# Patient Record
Sex: MALE | Race: WHITE | Employment: FULL TIME | URBAN - METROPOLITAN AREA
[De-identification: names, ages, dates, MRNs, and addresses within clinical notes are randomized per-mention and may not be internally consistent; named-entity substitution may affect disease eponyms.]

---

## 2018-05-01 ENCOUNTER — OFFICE VISIT (OUTPATIENT)
Dept: ENDOCRINOLOGY | Age: 43
End: 2018-05-01
Payer: MEDICAID

## 2018-05-01 VITALS
HEART RATE: 78 BPM | DIASTOLIC BLOOD PRESSURE: 89 MMHG | BODY MASS INDEX: 31.01 KG/M2 | SYSTOLIC BLOOD PRESSURE: 133 MMHG | HEIGHT: 73 IN | WEIGHT: 234 LBS

## 2018-05-01 DIAGNOSIS — E55.9 VITAMIN D DEFICIENCY: ICD-10-CM

## 2018-05-01 DIAGNOSIS — E29.1 HYPOGONADISM MALE: Primary | ICD-10-CM

## 2018-05-01 PROBLEM — G43.909 MIGRAINES: Status: ACTIVE | Noted: 2018-05-01

## 2018-05-01 PROCEDURE — G8427 DOCREV CUR MEDS BY ELIG CLIN: HCPCS | Performed by: INTERNAL MEDICINE

## 2018-05-01 PROCEDURE — 99213 OFFICE O/P EST LOW 20 MIN: CPT | Performed by: INTERNAL MEDICINE

## 2018-05-01 PROCEDURE — G8417 CALC BMI ABV UP PARAM F/U: HCPCS | Performed by: INTERNAL MEDICINE

## 2018-05-01 PROCEDURE — 1036F TOBACCO NON-USER: CPT | Performed by: INTERNAL MEDICINE

## 2018-05-01 RX ORDER — TESTOSTERONE CYPIONATE 200 MG/ML
INJECTION INTRAMUSCULAR
Qty: 10 ML | Refills: 0 | Status: SHIPPED | OUTPATIENT
Start: 2018-05-01 | End: 2018-06-14 | Stop reason: SDUPTHER

## 2018-06-14 DIAGNOSIS — E29.1 HYPOGONADISM MALE: Primary | ICD-10-CM

## 2018-06-14 RX ORDER — TESTOSTERONE CYPIONATE 200 MG/ML
INJECTION INTRAMUSCULAR
Qty: 10 ML | Refills: 0 | Status: SHIPPED | OUTPATIENT
Start: 2018-06-14 | End: 2018-09-18 | Stop reason: SDUPTHER

## 2018-07-03 ENCOUNTER — TELEPHONE (OUTPATIENT)
Dept: FAMILY MEDICINE CLINIC | Age: 43
End: 2018-07-03

## 2018-07-03 NOTE — TELEPHONE ENCOUNTER
Patient wife called stated she wanted to make sure the patients testosterone was covered and if he needed to continue with his follow up appt.  Please call patient and advise

## 2018-09-10 LAB
ANION GAP SERPL CALCULATED.3IONS-SCNC: 12 MMOL/L (ref 10–20)
BICARBONATE: 25 MMOL/L (ref 21–32)
BUN / CREAT RATIO: 14 (ref 5–25)
CALCIUM SERPL-MCNC: 9.1 MG/DL (ref 8.6–10.3)
CHLORIDE BLD-SCNC: 107 MMOL/L (ref 98–107)
CREAT SERPL-MCNC: 0.96 MG/DL (ref 0.5–1.3)
ERYTHROCYTE [DISTWIDTH] IN BLOOD BY AUTOMATED COUNT: 13.2 % (ref 12–15.4)
ERYTHROCYTE [DISTWIDTH] IN BLOOD BY AUTOMATED COUNT: 47.5 FL (ref 39.3–48.6)
GFR CALCULATED: >60
GLUCOSE: 95 MG/DL (ref 70–100)
HCT VFR BLD CALC: 47.4 % (ref 38.4–54.9)
HEMOGLOBIN: 15.5 G/DL (ref 12.8–17.7)
MCH RBC QN AUTO: 31.8 PG (ref 27.5–32.9)
MCHC RBC AUTO-ENTMCNC: 32.7 G/DL (ref 30.5–35.4)
MCV RBC AUTO: 97.3 FL (ref 83.3–98.2)
NUCLEATED RBCS: 0 /100{WBCS}
PLATELET # BLD: 223 10*3/UL (ref 155–404)
PMV BLD AUTO: 11.1 FL (ref 9.9–12.1)
POTASSIUM SERPL-SCNC: 4.6 MMOL/L (ref 3.5–5.1)
RBC: 4.87 10*6/UL (ref 4.08–6.37)
RBCS COUNTED: 0 10*3/UL
SODIUM BLD-SCNC: 139 MMOL/L (ref 136–145)
UREA NITROGEN: 13 MG/DL (ref 6–23)
VITAMIN D 25-HYDROXY: 22 NG/ML
WBC: 7 10*3/UL (ref 4.2–11)

## 2018-09-11 LAB
SEX HORMONE BINDING GLOBULIN: 10 NMOL/L (ref 11–80)
TESTOSTERONE FREE PERCENT: 3 % (ref 1.6–2.9)
TESTOSTERONE FREE-MALE: 263 PG/ML (ref 47–244)
TESTOSTERONE TOTAL-MALE: 872 NG/DL (ref 300–890)

## 2018-09-18 ENCOUNTER — OFFICE VISIT (OUTPATIENT)
Dept: ENDOCRINOLOGY | Age: 43
End: 2018-09-18
Payer: MEDICAID

## 2018-09-18 VITALS
HEART RATE: 95 BPM | SYSTOLIC BLOOD PRESSURE: 143 MMHG | HEIGHT: 73 IN | DIASTOLIC BLOOD PRESSURE: 103 MMHG | WEIGHT: 234 LBS | BODY MASS INDEX: 31.01 KG/M2

## 2018-09-18 DIAGNOSIS — E29.1 HYPOGONADISM MALE: Primary | ICD-10-CM

## 2018-09-18 DIAGNOSIS — G44.59 OTHER COMPLICATED HEADACHE SYNDROME: ICD-10-CM

## 2018-09-18 PROCEDURE — 99213 OFFICE O/P EST LOW 20 MIN: CPT | Performed by: INTERNAL MEDICINE

## 2018-09-18 PROCEDURE — G8417 CALC BMI ABV UP PARAM F/U: HCPCS | Performed by: INTERNAL MEDICINE

## 2018-09-18 PROCEDURE — G8427 DOCREV CUR MEDS BY ELIG CLIN: HCPCS | Performed by: INTERNAL MEDICINE

## 2018-09-18 PROCEDURE — 1036F TOBACCO NON-USER: CPT | Performed by: INTERNAL MEDICINE

## 2018-09-18 RX ORDER — TESTOSTERONE CYPIONATE 200 MG/ML
INJECTION INTRAMUSCULAR
Qty: 10 ML | Refills: 0 | Status: SHIPPED | OUTPATIENT
Start: 2018-09-18 | End: 2019-04-02 | Stop reason: SDUPTHER

## 2018-09-24 NOTE — PROGRESS NOTES
syndrome  Referral To Neurology - (CHEPE) MD Yevgeniy Reaves           Plan:      Orders Placed This Encounter   Procedures    Testosterone Free and Total Male     Standing Status:   Future     Standing Expiration Date:   9/18/2019    CBC     Standing Status:   Future     Standing Expiration Date:   9/18/2019    Referral To Neurology - (CHEPE) MD Yevgeniy Reaves     Referral Priority:   Routine     Referral Type:   Eval and Treat     Referral Reason:   Specialty Services Required     Referred to Provider:   Candice Abdi MD     Requested Specialty:   Neurology     Number of Visits Requested:   1     Orders Placed This Encounter   Medications    testosterone cypionate (DEPOTESTOTERONE CYPIONATE) 200 MG/ML injection     Sig: inject 1 ml INTRAMUSCULARLY once every 10 days. .     Dispense:  10 mL     Refill:  0

## 2019-04-02 ENCOUNTER — OFFICE VISIT (OUTPATIENT)
Dept: ENDOCRINOLOGY | Age: 44
End: 2019-04-02
Payer: MEDICAID

## 2019-04-02 VITALS
WEIGHT: 236 LBS | SYSTOLIC BLOOD PRESSURE: 145 MMHG | BODY MASS INDEX: 31.28 KG/M2 | HEART RATE: 80 BPM | HEIGHT: 73 IN | DIASTOLIC BLOOD PRESSURE: 101 MMHG

## 2019-04-02 DIAGNOSIS — E29.1 HYPOGONADISM MALE: ICD-10-CM

## 2019-04-02 DIAGNOSIS — D35.01 ADRENAL ADENOMA, RIGHT: Primary | ICD-10-CM

## 2019-04-02 PROCEDURE — 1036F TOBACCO NON-USER: CPT | Performed by: INTERNAL MEDICINE

## 2019-04-02 PROCEDURE — 99213 OFFICE O/P EST LOW 20 MIN: CPT | Performed by: INTERNAL MEDICINE

## 2019-04-02 PROCEDURE — G8417 CALC BMI ABV UP PARAM F/U: HCPCS | Performed by: INTERNAL MEDICINE

## 2019-04-02 PROCEDURE — G8427 DOCREV CUR MEDS BY ELIG CLIN: HCPCS | Performed by: INTERNAL MEDICINE

## 2019-04-02 RX ORDER — TESTOSTERONE CYPIONATE 200 MG/ML
INJECTION INTRAMUSCULAR
Qty: 10 ML | Refills: 0 | Status: SHIPPED | OUTPATIENT
Start: 2019-04-02 | End: 2019-08-12 | Stop reason: SDUPTHER

## 2019-04-02 NOTE — PROGRESS NOTES
Subjective:      Patient ID: Demetra Johnson is a 41 y.o. male  6  month f/u  hypogonadism   Other   This is a chronic (hypogonadism) problem. The current episode started more than 1 year ago. The problem has been unchanged. Treatments tried: Testosterone injections. The treatment provided moderate relief. No labs to review    Blood pressures have been high patient has been started on hydrochlorothiazide    pt had MRI SHOWING POSSIBLE ADRENAL ADENOMA at [facility] [place] according to him he had anxiety or panic attack    Denies any active or chest pain    According to patient he has had some anxiety off and on for many years      HAD MRI of abdomen done 4/1/19     Patient Active Problem List   Diagnosis    Hypogonadism male    Migraines           Allergies   Allergen Reactions    Nsaids Nausea Only       Current Outpatient Medications:     HYDROCHLOROTHIAZIDE PO, Take by mouth, Disp: , Rfl:     SYRINGE-NEEDLE, DISP, 3 ML (B-D INTEGRA SYRINGE) 22G X 1-1/2\" 3 ML MISC, Use as needed, Disp: 20 each, Rfl: 6    Pantoprazole Sodium (PROTONIX) 40 MG PACK packet, Take 40 mg by mouth daily. , Disp: , Rfl:     testosterone cypionate (DEPOTESTOTERONE CYPIONATE) 200 MG/ML injection, inject 1 ml INTRAMUSCULARLY once every 10 days. ., Disp: 10 mL, Rfl: 0      Review of Systems   Psychiatric/Behavioral: The patient is nervous/anxious. All other systems reviewed and are negative. Vitals:    04/02/19 1747   BP: (!) 145/101   Site: Right Upper Arm   Position: Sitting   Cuff Size: Large Adult   Pulse: 80   Weight: 236 lb (107 kg)   Height: 6' 1\" (1.854 m)       Objective:   Physical Exam   Constitutional: He appears well-developed and well-nourished. HENT:   Head: Normocephalic and atraumatic. Eyes: Conjunctivae are normal.   Neck: Neck supple. Cardiovascular: Normal rate. Abdominal:   Obese    Musculoskeletal: Normal range of motion. Neurological: He is alert. Psychiatric: He has a normal mood and affect. Assessment:       Diagnosis Orders   1. Adrenal adenoma, right     2. Hypogonadism male             Plan:      Orders Placed This Encounter   Procedures    Metanephrines Plasma Free     Standing Status:   Future     Standing Expiration Date:   4/2/2020    Cortisol Total     Standing Status:   Future     Standing Expiration Date:   4/2/2020     Order Specific Question:   8AM or 4PM?     Answer:   acth    Aldosterone & Renin, Direct With Ratio     Standing Status:   Future     Standing Expiration Date:   4/2/2020     Orders Placed This Encounter   Medications    testosterone cypionate (DEPOTESTOTERONE CYPIONATE) 200 MG/ML injection     Sig: inject 1 ml INTRAMUSCULARLY once every 10 days.      Dispense:  10 mL     Refill:  0    SYRINGE-NEEDLE, DISP, 3 ML (B-D INTEGRA SYRINGE) 22G X 1-1/2\" 3 ML MISC     Sig: Use as needed     Dispense:  20 each     Refill:  6     Patient to bring the copies of his MRI of the abdomen we will do serum free metanephrine to screen for pheochromocytoma follow-up in 4 weeks time

## 2019-04-04 LAB — CORTISOL TOTAL, AM: 7.5 UG/DL (ref 6.7–22.6)

## 2019-04-07 LAB
ALDOSTERONE/RENIN ACTIVITY CALCULATION: 6.8 RATIO
ALDOSTERONE: 6.2 NG/DL
RENIN ACTIVITY: 0.9 NG/ML/HR

## 2019-04-08 LAB
INTERPRETATION: NORMAL
METANEPHRINE: 0.22 NMOL/L (ref 0–0.49)
NORMETANEPHRINE: 0.21 NMOL/L (ref 0–0.89)

## 2019-04-30 ENCOUNTER — OFFICE VISIT (OUTPATIENT)
Dept: ENDOCRINOLOGY | Age: 44
End: 2019-04-30
Payer: MEDICAID

## 2019-04-30 VITALS
DIASTOLIC BLOOD PRESSURE: 95 MMHG | HEIGHT: 73 IN | SYSTOLIC BLOOD PRESSURE: 163 MMHG | WEIGHT: 237 LBS | HEART RATE: 99 BPM | BODY MASS INDEX: 31.41 KG/M2

## 2019-04-30 DIAGNOSIS — E29.1 HYPOGONADISM MALE: ICD-10-CM

## 2019-04-30 DIAGNOSIS — I10 ESSENTIAL HYPERTENSION: Primary | ICD-10-CM

## 2019-04-30 PROCEDURE — 99213 OFFICE O/P EST LOW 20 MIN: CPT | Performed by: INTERNAL MEDICINE

## 2019-04-30 PROCEDURE — 1036F TOBACCO NON-USER: CPT | Performed by: INTERNAL MEDICINE

## 2019-04-30 PROCEDURE — G8427 DOCREV CUR MEDS BY ELIG CLIN: HCPCS | Performed by: INTERNAL MEDICINE

## 2019-04-30 PROCEDURE — G8417 CALC BMI ABV UP PARAM F/U: HCPCS | Performed by: INTERNAL MEDICINE

## 2019-04-30 RX ORDER — AMLODIPINE BESYLATE 5 MG/1
5 TABLET ORAL DAILY
Qty: 30 TABLET | Refills: 3 | Status: SHIPPED | OUTPATIENT
Start: 2019-04-30 | End: 2019-11-11 | Stop reason: SDUPTHER

## 2019-04-30 NOTE — PROGRESS NOTES
Subjective:      Patient ID: Garrett Love is a 41 y.o. male  ONE   month f/u  hypogonadism HTN  Hypertension   This is a chronic problem. The current episode started more than 1 year ago. The problem has been waxing and waning since onset. Past treatments include diuretics. Other   This is a chronic (hypogonadism) problem. The current episode started more than 1 year ago. The problem has been unchanged. Treatments tried: Testosterone injections. The treatment provided moderate relief. Blood pressures have been high patient has been started on hydrochlorothiazide    MRI abdomen showing 2.8 cm right kidney sinus lesion no associated restrictive disease representing possible hemorrhagic proteinaceous cyst no hydronephrosis      Reviewed labs adrenal testing was normal    Results for Marsa See (MRN [id number]) as of 4/30/2019 18:06   Ref. Range 4/4/2019 08:53   Aldosterone Latest Units: ng/dL 6.2   Normetanephrine Latest Ref Range: 0.00 - 0.89 nmol/L 0.21   Aldosterone/Renin Activity Calculation Latest Ref Range: <=25.0 ratio 6.8   Renin Activity Latest Units: ng/mL/hr 0.9   Cortisol Total, AM Latest Ref Range: 6.7 - 22.6 ug/dL 7.5   Interpretation Unknown See Note   Metanephrine Latest Ref Range: 0.00 - 0.49 nmol/L 0.22       Patient Active Problem List   Diagnosis    Hypogonadism male    Migraines           Allergies   Allergen Reactions    Nsaids Nausea Only       Current Outpatient Medications:     amLODIPine (NORVASC) 5 MG tablet, Take 1 tablet by mouth daily, Disp: 30 tablet, Rfl: 3    testosterone cypionate (DEPOTESTOTERONE CYPIONATE) 200 MG/ML injection, inject 1 ml INTRAMUSCULARLY once every 10 days. , Disp: 10 mL, Rfl: 0    SYRINGE-NEEDLE, DISP, 3 ML (B-D INTEGRA SYRINGE) 22G X 1-1/2\" 3 ML MISC, Use as needed, Disp: 20 each, Rfl: 6    Pantoprazole Sodium (PROTONIX) 40 MG PACK packet, Take 40 mg by mouth daily. , Disp: , Rfl:       Review of Systems   Cardiovascular: Negative. Endocrine: Negative. All other systems reviewed and are negative. Vitals:    04/30/19 1558   BP: (!) 163/95   Pulse: 99   Weight: 237 lb (107.5 kg)   Height: 6' 1\" (1.854 m)       Objective:   Physical Exam   Constitutional: He appears well-developed and well-nourished. HENT:   Head: Normocephalic and atraumatic. Neck: Neck supple. Cardiovascular: Normal rate. Musculoskeletal: Normal range of motion. Neurological: He is alert. Psychiatric: He has a normal mood and affect. Assessment:       Diagnosis Orders   1. Essential hypertension  Testosterone Free and Total Male   2.  Hypogonadism male             Plan:      Orders Placed This Encounter   Procedures    Testosterone Free and Total Male     Standing Status:   Future     Standing Expiration Date:   4/30/2020     Orders Placed This Encounter   Medications    amLODIPine (NORVASC) 5 MG tablet     Sig: Take 1 tablet by mouth daily     Dispense:  30 tablet     Refill:  3     Advised patient to see a UROLOGIST  in view of renal cyst  Continue testosterone at 200 mg every 10 days  STOP HCTZ

## 2019-05-30 ENCOUNTER — TELEPHONE (OUTPATIENT)
Dept: ENDOCRINOLOGY | Age: 44
End: 2019-05-30

## 2019-05-30 NOTE — TELEPHONE ENCOUNTER
Wife is calling to see if PA was done for testosterone. She stated the pharmacy has faxed the office 4 times for this to be completed.       Dr. Jolynn Jones MA is aware of PA and will be working on [id number] tomorrow 5/31/19

## 2019-06-06 ENCOUNTER — TELEPHONE (OUTPATIENT)
Dept: ENDOCRINOLOGY | Age: 44
End: 2019-06-06

## 2019-06-07 ENCOUNTER — TELEPHONE (OUTPATIENT)
Dept: ENDOCRINOLOGY | Age: 44
End: 2019-06-07

## 2019-06-07 NOTE — TELEPHONE ENCOUNTER
Called to inform wife that PA was submitted last night and the office is waiting for a response from the insurance company.  Wife asked to be updated when we receive status of PA

## 2019-08-12 DIAGNOSIS — E29.1 HYPOGONADISM MALE: ICD-10-CM

## 2019-08-13 RX ORDER — TESTOSTERONE CYPIONATE 200 MG/ML
INJECTION INTRAMUSCULAR
Qty: 10 ML | Refills: 1 | Status: SHIPPED | OUTPATIENT
Start: 2019-08-13 | End: 2019-11-11 | Stop reason: SDUPTHER

## 2019-11-07 DIAGNOSIS — D35.01 ADRENAL ADENOMA, RIGHT: ICD-10-CM

## 2019-11-07 DIAGNOSIS — I10 ESSENTIAL HYPERTENSION: ICD-10-CM

## 2019-11-07 LAB — CORTISOL TOTAL: 6.8 UG/DL

## 2019-11-09 LAB
SEX HORMONE BINDING GLOBULIN: 8 NMOL/L (ref 11–80)
TESTOSTERONE FREE PERCENT: 2.9 % (ref 1.6–2.9)
TESTOSTERONE FREE, CALC: 48 PG/ML (ref 47–244)
TESTOSTERONE TOTAL-MALE: 167 NG/DL (ref 300–890)

## 2019-11-11 ENCOUNTER — OFFICE VISIT (OUTPATIENT)
Dept: ENDOCRINOLOGY | Age: 44
End: 2019-11-11
Payer: MEDICAID

## 2019-11-11 VITALS
RESPIRATION RATE: 18 BRPM | BODY MASS INDEX: 30.85 KG/M2 | OXYGEN SATURATION: 97 % | HEART RATE: 91 BPM | DIASTOLIC BLOOD PRESSURE: 92 MMHG | SYSTOLIC BLOOD PRESSURE: 136 MMHG | WEIGHT: 232.8 LBS | HEIGHT: 73 IN

## 2019-11-11 DIAGNOSIS — E29.1 HYPOGONADISM MALE: ICD-10-CM

## 2019-11-11 DIAGNOSIS — N28.1 RENAL CYST, LEFT: Primary | ICD-10-CM

## 2019-11-11 PROCEDURE — 99213 OFFICE O/P EST LOW 20 MIN: CPT | Performed by: INTERNAL MEDICINE

## 2019-11-11 PROCEDURE — G8417 CALC BMI ABV UP PARAM F/U: HCPCS | Performed by: INTERNAL MEDICINE

## 2019-11-11 PROCEDURE — 1036F TOBACCO NON-USER: CPT | Performed by: INTERNAL MEDICINE

## 2019-11-11 PROCEDURE — G8427 DOCREV CUR MEDS BY ELIG CLIN: HCPCS | Performed by: INTERNAL MEDICINE

## 2019-11-11 PROCEDURE — G8484 FLU IMMUNIZE NO ADMIN: HCPCS | Performed by: INTERNAL MEDICINE

## 2019-11-11 RX ORDER — CHLORHEXIDINE GLUCONATE 0.12 MG/ML
RINSE ORAL
Refills: 3 | COMMUNITY
Start: 2019-11-07

## 2019-11-11 RX ORDER — TESTOSTERONE CYPIONATE 200 MG/ML
INJECTION INTRAMUSCULAR
Qty: 3 ML | Refills: 5 | Status: SHIPPED | OUTPATIENT
Start: 2019-11-11 | End: 2020-05-28

## 2019-11-11 RX ORDER — ERENUMAB-AOOE 140 MG/ML
INJECTION, SOLUTION SUBCUTANEOUS
COMMUNITY
Start: 2019-11-06 | End: 2021-09-10 | Stop reason: ALTCHOICE

## 2019-11-11 RX ORDER — AMLODIPINE BESYLATE 5 MG/1
5 TABLET ORAL DAILY
Qty: 30 TABLET | Refills: 3 | Status: SHIPPED | OUTPATIENT
Start: 2019-11-11

## 2019-11-11 RX ORDER — TESTOSTERONE CYPIONATE 200 MG/ML
INJECTION INTRAMUSCULAR
Qty: 10 ML | Refills: 1 | Status: SHIPPED | OUTPATIENT
Start: 2019-11-11 | End: 2019-11-11 | Stop reason: SDUPTHER

## 2019-11-12 LAB
METANEPH/PLASMA INTERP: NORMAL
METANEPHRINE FREE PLASMA: 0.22 NMOL/L (ref 0–0.49)
NORMETANEPHRINE FREE PLASMA: 0.5 NMOL/L (ref 0–0.89)

## 2019-11-22 ENCOUNTER — OFFICE VISIT (OUTPATIENT)
Dept: UROLOGY | Age: 44
End: 2019-11-22
Payer: MEDICAID

## 2019-11-22 VITALS
BODY MASS INDEX: 30.09 KG/M2 | WEIGHT: 227 LBS | HEIGHT: 73 IN | SYSTOLIC BLOOD PRESSURE: 160 MMHG | HEART RATE: 96 BPM | DIASTOLIC BLOOD PRESSURE: 100 MMHG

## 2019-11-22 DIAGNOSIS — N28.1 RENAL CYST: Primary | ICD-10-CM

## 2019-11-22 LAB
BILIRUBIN, POC: NORMAL
BLOOD URINE, POC: NORMAL
CLARITY, POC: CLEAR
COLOR, POC: YELLOW
GLUCOSE URINE, POC: NORMAL
KETONES, POC: NORMAL
LEUKOCYTE EST, POC: NORMAL
NITRITE, POC: NORMAL
PH, POC: 5
PROTEIN, POC: NORMAL
SPECIFIC GRAVITY, POC: 1.01
UROBILINOGEN, POC: 0.2

## 2019-11-22 PROCEDURE — 1036F TOBACCO NON-USER: CPT | Performed by: UROLOGY

## 2019-11-22 PROCEDURE — G8417 CALC BMI ABV UP PARAM F/U: HCPCS | Performed by: UROLOGY

## 2019-11-22 PROCEDURE — G8427 DOCREV CUR MEDS BY ELIG CLIN: HCPCS | Performed by: UROLOGY

## 2019-11-22 PROCEDURE — G8484 FLU IMMUNIZE NO ADMIN: HCPCS | Performed by: UROLOGY

## 2019-11-22 PROCEDURE — 81003 URINALYSIS AUTO W/O SCOPE: CPT | Performed by: UROLOGY

## 2019-11-22 PROCEDURE — 99203 OFFICE O/P NEW LOW 30 MIN: CPT | Performed by: UROLOGY

## 2019-11-22 ASSESSMENT — ENCOUNTER SYMPTOMS
RESPIRATORY NEGATIVE: 1
EYES NEGATIVE: 1
ABDOMINAL PAIN: 0
GASTROINTESTINAL NEGATIVE: 1
SHORTNESS OF BREATH: 0
ABDOMINAL DISTENTION: 0
ALLERGIC/IMMUNOLOGIC NEGATIVE: 1

## 2019-12-10 ENCOUNTER — HOSPITAL ENCOUNTER (OUTPATIENT)
Dept: MRI IMAGING | Age: 44
Discharge: HOME | End: 2019-12-12
Payer: MEDICAID

## 2019-12-10 DIAGNOSIS — N28.1 RENAL CYST: ICD-10-CM

## 2019-12-10 PROCEDURE — A9577 INJ MULTIHANCE: HCPCS | Performed by: RADIOLOGY

## 2019-12-10 PROCEDURE — 6360000004 HC RX CONTRAST MEDICATION: Performed by: RADIOLOGY

## 2019-12-10 PROCEDURE — 74183 MRI ABD W/O CNTR FLWD CNTR: CPT

## 2019-12-10 RX ORDER — SODIUM CHLORIDE 0.9 % (FLUSH) 0.9 %
10 SYRINGE (ML) INJECTION 2 TIMES DAILY
Status: DISCONTINUED | OUTPATIENT
Start: 2019-12-10 | End: 2019-12-13 | Stop reason: HOSPADM

## 2019-12-10 RX ADMIN — GADOBENATE DIMEGLUMINE 20 ML: 529 INJECTION, SOLUTION INTRAVENOUS at 09:01

## 2019-12-17 ENCOUNTER — OFFICE VISIT (OUTPATIENT)
Dept: UROLOGY | Age: 44
End: 2019-12-17
Payer: MEDICAID

## 2019-12-17 VITALS
DIASTOLIC BLOOD PRESSURE: 94 MMHG | HEIGHT: 73 IN | HEART RATE: 95 BPM | WEIGHT: 230 LBS | SYSTOLIC BLOOD PRESSURE: 146 MMHG | BODY MASS INDEX: 30.48 KG/M2

## 2019-12-17 DIAGNOSIS — N28.1 RENAL CYST, ACQUIRED, LEFT: Primary | ICD-10-CM

## 2019-12-17 PROCEDURE — G8484 FLU IMMUNIZE NO ADMIN: HCPCS | Performed by: UROLOGY

## 2019-12-17 PROCEDURE — G8427 DOCREV CUR MEDS BY ELIG CLIN: HCPCS | Performed by: UROLOGY

## 2019-12-17 PROCEDURE — 99213 OFFICE O/P EST LOW 20 MIN: CPT | Performed by: UROLOGY

## 2019-12-17 PROCEDURE — G8417 CALC BMI ABV UP PARAM F/U: HCPCS | Performed by: UROLOGY

## 2019-12-17 PROCEDURE — 1036F TOBACCO NON-USER: CPT | Performed by: UROLOGY

## 2019-12-17 ASSESSMENT — ENCOUNTER SYMPTOMS
ABDOMINAL DISTENTION: 0
ABDOMINAL PAIN: 0

## 2020-02-20 ENCOUNTER — TELEPHONE (OUTPATIENT)
Dept: NEUROLOGY | Age: 45
End: 2020-02-20

## 2020-02-20 RX ORDER — ERENUMAB-AOOE 140 MG/ML
140 INJECTION, SOLUTION SUBCUTANEOUS
Qty: 1 PEN | Refills: 11 | Status: SHIPPED | OUTPATIENT
Start: 2020-02-20 | End: 2020-07-28 | Stop reason: SDUPTHER

## 2020-04-09 RX ORDER — SYRINGE WITH NEEDLE, 1 ML 25GX5/8"
SYRINGE, EMPTY DISPOSABLE MISCELLANEOUS
Qty: 20 EACH | Refills: 0 | Status: SHIPPED | OUTPATIENT
Start: 2020-04-09

## 2020-05-05 ENCOUNTER — VIRTUAL VISIT (OUTPATIENT)
Dept: ENDOCRINOLOGY | Age: 45
End: 2020-05-05
Payer: MEDICAID

## 2020-05-05 PROCEDURE — 99213 OFFICE O/P EST LOW 20 MIN: CPT | Performed by: INTERNAL MEDICINE

## 2020-05-28 RX ORDER — TESTOSTERONE CYPIONATE 200 MG/ML
INJECTION INTRAMUSCULAR
Qty: 10 ML | Refills: 2 | Status: SHIPPED | OUTPATIENT
Start: 2020-05-28 | End: 2020-11-28

## 2020-06-04 ENCOUNTER — TELEPHONE (OUTPATIENT)
Dept: ENDOCRINOLOGY | Age: 45
End: 2020-06-04

## 2020-07-28 ENCOUNTER — OFFICE VISIT (OUTPATIENT)
Dept: NEUROLOGY | Age: 45
End: 2020-07-28
Payer: MEDICAID

## 2020-07-28 VITALS — WEIGHT: 235 LBS | BODY MASS INDEX: 31 KG/M2 | DIASTOLIC BLOOD PRESSURE: 76 MMHG | SYSTOLIC BLOOD PRESSURE: 128 MMHG

## 2020-07-28 PROCEDURE — 99214 OFFICE O/P EST MOD 30 MIN: CPT | Performed by: PSYCHIATRY & NEUROLOGY

## 2020-07-28 PROCEDURE — 1036F TOBACCO NON-USER: CPT | Performed by: PSYCHIATRY & NEUROLOGY

## 2020-07-28 PROCEDURE — G8427 DOCREV CUR MEDS BY ELIG CLIN: HCPCS | Performed by: PSYCHIATRY & NEUROLOGY

## 2020-07-28 PROCEDURE — G8417 CALC BMI ABV UP PARAM F/U: HCPCS | Performed by: PSYCHIATRY & NEUROLOGY

## 2020-07-28 RX ORDER — LOSARTAN POTASSIUM 50 MG/1
50 TABLET ORAL
COMMUNITY
Start: 2020-07-10

## 2020-07-28 RX ORDER — ERENUMAB-AOOE 140 MG/ML
140 INJECTION, SOLUTION SUBCUTANEOUS
Qty: 1 PEN | Refills: 11 | Status: SHIPPED | OUTPATIENT
Start: 2020-07-28 | End: 2021-08-09 | Stop reason: SDUPTHER

## 2020-07-28 RX ORDER — UBROGEPANT 100 MG/1
100 TABLET ORAL DAILY PRN
Qty: 8 TABLET | Refills: 3 | Status: SHIPPED | OUTPATIENT
Start: 2020-07-28 | End: 2021-09-10 | Stop reason: SDUPTHER

## 2020-07-28 ASSESSMENT — ENCOUNTER SYMPTOMS
NAUSEA: 0
COLOR CHANGE: 0
SHORTNESS OF BREATH: 0
PHOTOPHOBIA: 0
BACK PAIN: 0
TROUBLE SWALLOWING: 0
VOMITING: 0
CHOKING: 0

## 2020-07-28 NOTE — PROGRESS NOTES
Subjective:      Patient ID: Stephanie Ansari is a 45 y.o. male who presents today for:  Chief Complaint   Patient presents with    Follow-up     pt doing well       HPI 61-year-old right-handed gentleman with history of migraine and his basilar migraines and vertebrobasilar insufficiency. Actually doing very well on Aimovig. Since he started the medication patient has done well he had 2 breakthrough headaches in the last year. He is not a candidate for triptan's as he had had less tightness from the same. He has no other side effects of medication is doing much better with the medication than without this. We had given him Esgic as is not a candidate for other medications although he is only up to use it twice.   Past Medical History:   Diagnosis Date    Hypogonadism in male     Joint pain     Elbow, knee, shoulder and wrist    Migraines      Past Surgical History:   Procedure Laterality Date    COLONOSCOPY  7/30/2012    FOREIGN BODY REMOVAL      Metal from neck    UPPER GASTROINTESTINAL ENDOSCOPY  6/27/2012    hiatus hernia     Social History     Socioeconomic History    Marital status:      Spouse name: Not on file    Number of children: Not on file    Years of education: Not on file    Highest education level: Not on file   Occupational History    Not on file   Social Needs    Financial resource strain: Not on file    Food insecurity     Worry: Not on file     Inability: Not on file   [employer] needs     Medical: Not on file     Non-medical: Not on file   Tobacco Use    Smoking status: Never Smoker    Smokeless tobacco: Never Used   Substance and Sexual Activity    Alcohol use: No    Drug use: No    Sexual activity: Yes     Partners: Female   Lifestyle    Physical activity     Days per week: Not on file     Minutes per session: Not on file    Stress: Not on file   Relationships    Social connections     Talks on phone: Not on file     Gets together: Not on file Attends Jain service: Not on file     Active member of club or organization: Not on file     Attends meetings of clubs or organizations: Not on file     Relationship status: Not on file    Intimate partner violence     Fear of current or ex partner: Not on file     Emotionally abused: Not on file     Physically abused: Not on file     Forced sexual activity: Not on file   Other Topics Concern    Not on file   Social History Narrative    Not on file     Family History   Problem Relation Age of Onset    Diabetes Mother     High Blood Pressure Father      No Known Allergies    Current Outpatient Medications   Medication Sig Dispense Refill    losartan (COZAAR) 50 MG tablet 50 mg      AIMOVIG 140 MG/ML SOAJ Inject 140 mg into the skin every 30 days 1 pen 11    Ubrogepant (UBRELVY) 100 MG TABS Take 100 mg by mouth daily as needed (headache) 8 tablet 3    testosterone cypionate (DEPOTESTOTERONE CYPIONATE) 200 MG/ML injection inject 1ml intramuscularly once every 10 days 10 mL 2    SYRINGE-NEEDLE, DISP, 3 ML (B-D 3CC LUER-MELANY SYR 84AU6-2/2) 22G X 1-1/2\" 3 ML MISC Use as needed 20 each 0    AIMOVIG 140 MG/ML SOAJ       chlorhexidine (PERIDEX) 0.12 % solution RINSE MOUTH WITH 15 ML TWICE DAILY  3    amLODIPine (NORVASC) 5 MG tablet Take 1 tablet by mouth daily 30 tablet 3    Pantoprazole Sodium (PROTONIX) 40 MG PACK packet Take 40 mg by mouth daily. No current facility-administered medications for this visit. Review of Systems   Constitutional: Negative for fever. HENT: Negative for ear pain, tinnitus and trouble swallowing. Eyes: Negative for photophobia and visual disturbance. Respiratory: Negative for choking and shortness of breath. Cardiovascular: Negative for chest pain and palpitations. Gastrointestinal: Negative for nausea and vomiting. Musculoskeletal: Negative for back pain, gait problem, joint swelling, myalgias, neck pain and neck stiffness.    Skin: Negative for color change. Allergic/Immunologic: Negative for food allergies. Neurological: Negative for dizziness, tremors, seizures, syncope, facial asymmetry, speech difficulty, weakness, light-headedness, numbness and headaches. Psychiatric/Behavioral: Negative for behavioral problems, confusion, hallucinations and sleep disturbance. Objective:   /76   Wt 235 lb (106.6 kg)   BMI 31.00 kg/m²     Physical Exam  Vitals signs reviewed. Eyes:      Pupils: Pupils are equal, round, and reactive to light. Neck:      Musculoskeletal: Normal range of motion. Cardiovascular:      Rate and Rhythm: Normal rate and regular rhythm. Heart sounds: No murmur. Pulmonary:      Effort: Pulmonary effort is normal.      Breath sounds: Normal breath sounds. Abdominal:      General: Bowel sounds are normal.   Musculoskeletal: Normal range of motion. Skin:     General: Skin is warm. Neurological:      Mental Status: He is alert and oriented to person, place, and time. Cranial Nerves: No cranial nerve deficit. Sensory: No sensory deficit. Motor: No abnormal muscle tone. Coordination: Coordination normal.      Deep Tendon Reflexes: Reflexes are normal and symmetric. Babinski sign absent on the right side. Babinski sign absent on the left side. Psychiatric:         Mood and Affect: Mood normal.         Mri Abdomen W Wo Contrast    Result Date: 12/10/2019  MRI OF THE ABDOMEN, ATTENTION LEFT RENAL CYST, BEFORE AND AFTER CONTRAST ENHANCEMENT INDICATION: Follow-up left renal lesion TECHNIQUE: Multiplanar, multi-sequence MRI was performed on a 1.0 open magnet, including imaging before and after intravenous administration of 20 mL MultiHance. COMPARISON:   April 1, 2019 MRI and CT performed at [address] [place] [address], [address]  FINDINGS: There is an unchanged 3 cm long, 2.8 cm diameter sharply circumscribed homogenous, T2 hyperintense, T1 hypointense, nonenhancing parapelvic lesion within the left kidney. Subtraction images show no sign of tumor vascularity. The right kidney remains unremarkable. No significant incidental abnormalities are seen within the field-of-view. 1.   BENIGN-APPEARING, BENIGN BEHAVING PARAPELVIC CYST WITHIN THE LEFT KIDNEY COMPARED TO APRIL 1, 2019 EXAMINATION PERFORMED ELSEWHERE. 2.  NO SIGNIFICANT INCIDENTAL FINDINGS WITHIN THE FIELD-OF-VIEW. Lab Results   Component Value Date    WBC 7.0 09/10/2018    RBC 4.87 09/10/2018    HGB 15.5 09/10/2018    HCT 47.4 09/10/2018    MCV 97.3 09/10/2018    MCH 31.8 09/10/2018    MCHC 32.7 09/10/2018     09/10/2018    MPV 11.1 09/10/2018     Lab Results   Component Value Date     09/10/2018    K 4.6 09/10/2018     09/10/2018    CREATININE 0.96 09/10/2018    LABGLOM >60 09/10/2018    GLUCOSE 95 09/10/2018    CALCIUM 9.1 09/10/2018     No results found for: PROTIME, INR  No results found for: TSH, BITYOCEQ48, FOLATE, FERRITIN, IRON, TIBC, PTRFSAT, TSH, FREET4  Lab Results   Component Value Date    TRIG 199 04/14/2015    HDL 36 04/14/2015    LDLCALC 106 04/14/2015     No results found for: [employer], LABBENZ, CANNAB, COCAINESCRN, LABMETH, OPIATESCREENURINE, PHENCYCLIDINESCREENURINE, PPXUR, ETOH  No results found for: LITHIUM, DILFRTOT, VALPROATE    Assessment:       Diagnosis Orders   1. Intractable chronic migraine without aura and without status migrainosus     Tractable migraine headaches consistent with basilar migraines with vertebral basilar insufficiency. Patient is doing quite well on Aimovig and evaluate to headaches in the last year we see him yearly. He has no side effects of medication. He is not a candidate for Imitrex or triptan's given that he had chest tightness. Patient has been tried on multiple medications in the past none of this has helped. He failed muscle relaxants as well.   He failed Esgic though we had given him a prescription given that he had nothing else to give

## 2020-07-31 ENCOUNTER — TELEPHONE (OUTPATIENT)
Dept: NEUROLOGY | Age: 45
End: 2020-07-31

## 2020-08-10 LAB
ERYTHROCYTE [DISTWIDTH] IN BLOOD BY AUTOMATED COUNT: 13.9 % (ref 11.5–14)
HCT VFR BLD CALC: 50.8 % (ref 41–52)
HEMOGLOBIN: 16.1 G/DL (ref 13.5–17)
MCHC RBC AUTO-ENTMCNC: 31.7 G/DL (ref 32–36)
MCV RBC AUTO: 100 FL (ref 80–100)
PLATELET # BLD: 217 X10E9/L (ref 150–450)
RBC # BLD: 5.09 X10E12/L (ref 4.5–5.9)
WBC: 6 X10E9/L (ref 4.4–11.3)

## 2020-08-14 LAB
TESTOSTERONE FREE: 31.8 PG/ML (ref 35–155)
TESTOSTERONE TOTAL-MALE: 120 NG/DL (ref 250–1100)

## 2020-09-28 ENCOUNTER — TELEPHONE (OUTPATIENT)
Dept: INTERNAL MEDICINE CLINIC | Age: 45
End: 2020-09-28

## 2020-09-28 NOTE — TELEPHONE ENCOUNTER
Pt got a denial letter for testosterone injections and insurance said gel and patches are covered they were wondering if they could try the gel or patch if it would be as effective or if we should do a prior auth for the injection please call patient and advise

## 2020-10-01 ENCOUNTER — TELEPHONE (OUTPATIENT)
Dept: ENDOCRINOLOGY | Age: 45
End: 2020-10-01

## 2020-10-01 NOTE — TELEPHONE ENCOUNTER
Patient's wife Sabrina Mccall calling to follow up on PA denial of Testosterone Cypionate. She wanted to know if there is a different medication that is formulary that patient can use? Please advise Patient is interesting in using Testosterone Gel. She would like for you to call her at [phone].

## 2020-10-20 ENCOUNTER — TELEPHONE (OUTPATIENT)
Dept: ENDOCRINOLOGY | Age: 45
End: 2020-10-20

## 2020-10-20 NOTE — TELEPHONE ENCOUNTER
Patient needs a new RX for testosterone Gel , his testosterone injection was denied.   The new RX is needed to finish the PA

## 2020-10-27 ENCOUNTER — TELEPHONE (OUTPATIENT)
Dept: ENDOCRINOLOGY | Age: 45
End: 2020-10-27

## 2020-10-27 NOTE — TELEPHONE ENCOUNTER
Shae Coon, patient's wife is calling about testosterone denial.  Informed her that we just received the denial and that we will let her know what the next step is. Please let them know. Patient would be willing to try the patch if that is an option.

## 2020-11-02 ENCOUNTER — TELEPHONE (OUTPATIENT)
Dept: ENDOCRINOLOGY | Age: 45
End: 2020-11-02

## 2020-11-02 DIAGNOSIS — E29.1 HYPOGONADISM MALE: Primary | ICD-10-CM

## 2020-11-02 RX ORDER — TESTOSTERONE 4 MG/D
PATCH TRANSDERMAL
Qty: 30 PATCH | Refills: 2 | Status: SHIPPED | OUTPATIENT
Start: 2020-11-02 | End: 2023-09-08

## 2020-11-03 ENCOUNTER — VIRTUAL VISIT (OUTPATIENT)
Dept: ENDOCRINOLOGY | Age: 45
End: 2020-11-03
Payer: MEDICAID

## 2020-11-03 PROCEDURE — 99213 OFFICE O/P EST LOW 20 MIN: CPT | Performed by: INTERNAL MEDICINE

## 2020-11-03 PROCEDURE — G8428 CUR MEDS NOT DOCUMENT: HCPCS | Performed by: INTERNAL MEDICINE

## 2020-11-03 RX ORDER — TESTOSTERONE 12.5 MG/1.25G
5 GEL TOPICAL DAILY
Qty: 60 PACKAGE | Refills: 3 | Status: SHIPPED | OUTPATIENT
Start: 2020-11-03 | End: 2020-12-03

## 2020-11-03 NOTE — PROGRESS NOTES
11/3/2020    TELEHEALTH EVALUATION -- Audio/Visual (During MSKTQ-27 public health emergency)    Due to COVID 19 outbreak, patient's office visit was converted to a virtual visit. Patient was contacted and agreed to proceed with a virtual visit via Doxy. me  The risks and benefits of converting to a virtual visit were discussed in light of the current infectious disease epidemic. Patient also understood that insurance coverage and co-pays are up to their individual insurance plans. HPI: Video patient was at home I was at my office    Leslie Pizarro (:  1975) has requested an audio/video evaluation for the following concern(s):    Hypogonadism patient has been on testosterone replacement in the past with his testosterone injections (not covered by insurance apparently 1% gel so the only one covered complains of fatigue tiredness  recent labs to review    Last testosterone level was 120 normal for his age 46+        Patient Active Problem List   Diagnosis    Hypogonadism male    Migraines       Review of Systems   Constitutional: Positive for fatigue. Genitourinary: Negative. All other systems reviewed and are negative. Prior to Visit Medications    Medication Sig Taking?  Authorizing Provider   Testosterone (ANDRODERM) 4 MG/24HR PT24 Use 1 patch daily  Meredith Rubin MD   losartan (COZAAR) 50 MG tablet 50 mg  Historical Provider, MD   AIMOVIG 140 MG/ML SOAJ Inject 140 mg into the skin every 30 days  Flaquito Flores MD   Ubrogepant (UBRELVY) 100 MG TABS Take 100 mg by mouth daily as needed (headache)  Tasha Shaw MD   testosterone cypionate (DEPOTESTOTERONE CYPIONATE) 200 MG/ML injection inject 1ml intramuscularly once every 10 days  Meredith Rubin MD   SYRINGE-NEEDLE, DISP, 3 ML (B-D 3CC LUER-MELANY SYR 13IM5-3/2) 22G X 1-1/2\" 3 ML MISC Use as needed  MD JHNOY Huynh 140 MG/ML SOAJ   Historical Provider, MD   chlorhexidine (PERIDEX) 0.12 % solution RINSE MOUTH WITH 15 ML TWICE DAILY Historical Provider, MD   amLODIPine (NORVASC) 5 MG tablet Take 1 tablet by mouth daily  Ignacio Howard MD   Pantoprazole Sodium (PROTONIX) 40 MG PACK packet Take 40 mg by mouth daily. Historical Provider, MD       Social History     Tobacco Use    Smoking status: Never Smoker    Smokeless tobacco: Never Used   Substance Use Topics    Alcohol use: No    Drug use: No            PHYSICAL EXAMINATION:  [ INSTRUCTIONS:  \"[x]\" Indicates a positive item  \"[]\" Indicates a negative item  -- DELETE ALL ITEMS NOT EXAMINED]  [] Alert  [] Oriented to person/place/time    [] No apparent distress  [] Toxic appearing    [] Face flushed appearing [] Sclera clear  [] Lips are cyanotic      [] Breathing appears normal  [] Appears tachypneic      [] Rash on visible skin    [] Cranial Nerves II-XII grossly intact    [] Motor grossly intact in visible upper extremities    [] Motor grossly intact in visible lower extremities    [] Normal Mood  [] Anxious appearing    [] Depressed appearing  [] Confused appearing      [] Poor short term memory  [] Poor long term memory    [] OTHER:      Due to this being a TeleHealth encounter, evaluation of the following organ systems is limited: Vitals/Constitutional/EENT/Resp/CV/GI//MS/Neuro/Skin/Heme-Lymph-Imm. ASSESSMENT/PLAN:     Diagnosis Orders   1. Hypogonadism male  testosterone (ANDROGEL) 25 MG/2.5GM (1%) GEL 1 % gel    Testosterone Free And Total Male     Orders Placed This Encounter   Procedures    Testosterone Free And Total Male     Standing Status:   Future     Standing Expiration Date:   11/3/2021     Orders Placed This Encounter   Medications    testosterone (ANDROGEL) 25 MG/2.5GM (1%) GEL 1 % gel     Sig: Apply 5 g topically daily for 30 days.  Use 2 daily     Dispense:  60 Package     Refill:  3     Start patient on AndroGel 1% 5 g 2 packets daily target testosterone range 5-600    Total time spent with patient review of labs was 15 minutes    An  electronic signature was used to authenticate this note. --Shree Tapia MD on 11/3/2020 at 11:47 AM        Pursuant to the emergency declaration under the [address] [place] [address] waiver authority and the [employer] Act, this Virtual  Visit was conducted, with patient's consent, to reduce the patient's risk of exposure to COVID-19 and provide continuity of care for an established patient. Services were provided through a video synchronous discussion virtually to substitute for in-person clinic visit.

## 2020-11-09 ENCOUNTER — TELEPHONE (OUTPATIENT)
Dept: NEUROLOGY | Age: 45
End: 2020-11-09

## 2020-11-09 NOTE — TELEPHONE ENCOUNTER
SUBMITTED PA FOR AIMOVIG VIA COVER MY MEDS      Your demographic data has been sent to the plan successfully. They will respond with your clinical questions and you will be notified by email when available within the next business day. You can also check for an update later by opening this request from your dashboard. Please do not fax or call the plan to resubmit this request.  If you need assistance, please chat with CoverMeds or call us at [phone].

## 2021-03-09 DIAGNOSIS — E29.1 HYPOGONADISM MALE: Primary | ICD-10-CM

## 2021-03-10 RX ORDER — TESTOSTERONE GEL, 1% 10 MG/G
GEL TRANSDERMAL
Qty: 300 G | Refills: 3 | Status: SHIPPED | OUTPATIENT
Start: 2021-03-10 | End: 2021-06-09

## 2021-06-09 DIAGNOSIS — E29.1 HYPOGONADISM MALE: ICD-10-CM

## 2021-06-09 RX ORDER — TESTOSTERONE GEL, 1% 10 MG/G
GEL TRANSDERMAL
Qty: 300 G | Refills: 0 | Status: SHIPPED | OUTPATIENT
Start: 2021-06-09 | End: 2021-07-21

## 2021-07-21 DIAGNOSIS — E29.1 HYPOGONADISM MALE: ICD-10-CM

## 2021-07-21 RX ORDER — TESTOSTERONE GEL, 1% 10 MG/G
GEL TRANSDERMAL
Qty: 300 G | Refills: 2 | Status: SHIPPED | OUTPATIENT
Start: 2021-07-21 | End: 2021-08-20

## 2021-08-09 RX ORDER — ERENUMAB-AOOE 140 MG/ML
140 INJECTION, SOLUTION SUBCUTANEOUS
Qty: 1 PEN | Refills: 0 | Status: SHIPPED | OUTPATIENT
Start: 2021-08-09 | End: 2021-09-10 | Stop reason: SDUPTHER

## 2021-09-09 PROBLEM — R10.11 RIGHT UPPER QUADRANT PAIN: Status: ACTIVE | Noted: 2019-03-09

## 2021-09-09 PROBLEM — I10 ESSENTIAL (PRIMARY) HYPERTENSION: Status: ACTIVE | Noted: 2019-04-01

## 2021-09-09 PROBLEM — N28.89 OTHER SPECIFIED DISORDERS OF KIDNEY AND URETER: Status: ACTIVE | Noted: 2019-04-01

## 2021-09-09 PROBLEM — F41.0 PANIC DISORDER: Status: ACTIVE | Noted: 2019-03-09

## 2021-09-09 PROBLEM — K76.0 FATTY (CHANGE OF) LIVER, NOT ELSEWHERE CLASSIFIED: Status: ACTIVE | Noted: 2019-03-09

## 2021-09-09 PROBLEM — K44.9 DIAPHRAGMATIC HERNIA WITHOUT OBSTRUCTION OR GANGRENE: Status: ACTIVE | Noted: 2019-03-09

## 2021-09-09 PROBLEM — R42 DIZZINESS AND GIDDINESS: Status: ACTIVE | Noted: 2019-03-09

## 2021-09-09 PROBLEM — E55.9 VITAMIN D DEFICIENCY: Status: ACTIVE | Noted: 2018-09-10

## 2021-09-09 PROBLEM — I67.1 CEREBRAL ANEURYSM, NONRUPTURED: Status: ACTIVE | Noted: 2018-11-30

## 2021-09-10 ENCOUNTER — OFFICE VISIT (OUTPATIENT)
Dept: NEUROLOGY | Age: 46
End: 2021-09-10
Payer: MEDICAID

## 2021-09-10 VITALS
WEIGHT: 240 LBS | HEIGHT: 73 IN | DIASTOLIC BLOOD PRESSURE: 100 MMHG | BODY MASS INDEX: 31.81 KG/M2 | HEART RATE: 90 BPM | OXYGEN SATURATION: 98 % | SYSTOLIC BLOOD PRESSURE: 138 MMHG

## 2021-09-10 DIAGNOSIS — G43.709 CHRONIC MIGRAINE WITHOUT AURA WITHOUT STATUS MIGRAINOSUS, NOT INTRACTABLE: Primary | ICD-10-CM

## 2021-09-10 PROCEDURE — G8417 CALC BMI ABV UP PARAM F/U: HCPCS | Performed by: STUDENT IN AN ORGANIZED HEALTH CARE EDUCATION/TRAINING PROGRAM

## 2021-09-10 PROCEDURE — 99213 OFFICE O/P EST LOW 20 MIN: CPT | Performed by: STUDENT IN AN ORGANIZED HEALTH CARE EDUCATION/TRAINING PROGRAM

## 2021-09-10 PROCEDURE — 1036F TOBACCO NON-USER: CPT | Performed by: STUDENT IN AN ORGANIZED HEALTH CARE EDUCATION/TRAINING PROGRAM

## 2021-09-10 PROCEDURE — G8427 DOCREV CUR MEDS BY ELIG CLIN: HCPCS | Performed by: STUDENT IN AN ORGANIZED HEALTH CARE EDUCATION/TRAINING PROGRAM

## 2021-09-10 RX ORDER — SPIRONOLACTONE 25 MG/1
TABLET ORAL
COMMUNITY
Start: 2021-09-02

## 2021-09-10 RX ORDER — ERENUMAB-AOOE 140 MG/ML
140 INJECTION, SOLUTION SUBCUTANEOUS
Qty: 1 PEN | Refills: 11 | Status: SHIPPED | OUTPATIENT
Start: 2021-09-10 | End: 2022-08-31 | Stop reason: SDUPTHER

## 2021-09-10 RX ORDER — ERGOCALCIFEROL 1.25 MG/1
CAPSULE ORAL
COMMUNITY
Start: 2021-09-02

## 2021-09-10 RX ORDER — UBROGEPANT 100 MG/1
100 TABLET ORAL DAILY PRN
Qty: 10 TABLET | Refills: 11 | Status: SHIPPED | OUTPATIENT
Start: 2021-09-10

## 2021-09-10 ASSESSMENT — ENCOUNTER SYMPTOMS
CHOKING: 0
COLOR CHANGE: 0
PHOTOPHOBIA: 0
BACK PAIN: 0
TROUBLE SWALLOWING: 0
NAUSEA: 0
SHORTNESS OF BREATH: 0
VOMITING: 0

## 2021-09-10 NOTE — PROGRESS NOTES
Subjective:      Patient ID: Iain Grijalva is a 42 y.o. male who presents today for:  No chief complaint on file. HPI 51-year-old right-handed gentleman with history of migraine and his basilar migraines and vertebrobasilar insufficiency. Patient doing well on Aimovig started 2 years ago. Previously having 3-10 headache days per month and has only had 2 migraines in 2 years. Uses Ubrelvy for rescue treatment which has efficacy as well. Patient not a candidate for triptans given that he had chest pain or shortness of breath as a side effect. Fioricet was ineffective. No visual field deficit, positional headache, exertional headache, or new or worsening neurological symptoms. Past Medical History:   Diagnosis Date    Hypogonadism in male     Joint pain     Elbow, knee, shoulder and wrist    Migraines      Past Surgical History:   Procedure Laterality Date    COLONOSCOPY  7/30/2012    FOREIGN BODY REMOVAL      Metal from neck    UPPER GASTROINTESTINAL ENDOSCOPY  6/27/2012    hiatus hernia     Social History     Socioeconomic History    Marital status:      Spouse name: Not on file    Number of children: Not on file    Years of education: Not on file    Highest education level: Not on file   Occupational History    Not on file   Tobacco Use    Smoking status: Never Smoker    Smokeless tobacco: Never Used   Substance and Sexual Activity    Alcohol use: No    Drug use: No    Sexual activity: Yes     Partners: Female   Other Topics Concern    Not on file   Social History Narrative    Not on file     Social Determinants of Health     Financial Resource Strain:     Difficulty of Paying Living Expenses:    Food Insecurity:     Worried About Running Out of Food in the Last Year:     Ran Out of Food in the Last Year:    Transportation Needs:     Lack of Transportation (Medical):      Lack of Transportation (Non-Medical):    Physical Activity:     Days of Exercise per Week:  Minutes of Exercise per Session:    Stress:     Feeling of Stress :    Social Connections:     Frequency of Communication with Friends and Family:     Frequency of Social Gatherings with Friends and Family:     Attends Baptism Services:     Active Member of Clubs or Organizations:     Attends Club or Organization Meetings:     Marital Status:    Intimate Partner Violence:     Fear of Current or Ex-Partner:     Emotionally Abused:     Physically Abused:     Sexually Abused:      Family History   Problem Relation Age of Onset    Diabetes Mother     High Blood Pressure Father      No Known Allergies    Current Outpatient Medications   Medication Sig Dispense Refill    vitamin D (ERGOCALCIFEROL) 1.25 MG (88432 UT) CAPS capsule TAKE 1 CAPSULE BY MOUTH weekly      spironolactone (ALDACTONE) 25 MG tablet TAKE 1 TABLET BY MOUTH EVERY MORNING      losartan (COZAAR) 50 MG tablet 50 mg      Ubrogepant (UBRELVY) 100 MG TABS Take 100 mg by mouth daily as needed (headache) 8 tablet 3    AIMOVIG 140 MG/ML SOAJ       chlorhexidine (PERIDEX) 0.12 % solution RINSE MOUTH WITH 15 ML TWICE DAILY  3    amLODIPine (NORVASC) 5 MG tablet Take 1 tablet by mouth daily 30 tablet 3    Pantoprazole Sodium (PROTONIX) 40 MG PACK packet Take 40 mg by mouth daily.  AIMOVIG 140 MG/ML SOAJ Inject 140 mg into the skin every 30 days 1 pen 0    testosterone (ANDROGEL; TESTIM) 50 MG/5GM (1%) GEL 1% gel apply 2 (TWO) packets topically EVERY  g 2    testosterone (ANDROGEL) 25 MG/2.5GM (1%) GEL 1 % gel Apply 5 g topically daily for 30 days.  Use 2 daily 60 Package 3    Testosterone (ANDRODERM) 4 MG/24HR PT24 Use 1 patch daily 30 patch 2    testosterone cypionate (DEPOTESTOTERONE CYPIONATE) 200 MG/ML injection inject 1ml intramuscularly once every 10 days 10 mL 2    SYRINGE-NEEDLE, DISP, 3 ML (B-D 3CC LUER-MELANY SYR 89YF2-3/2) 22G X 1-1/2\" 3 ML MISC Use as needed (Patient not taking: Reported on 9/10/2021) 20 each 0 No current facility-administered medications for this visit. Review of Systems   Constitutional: Negative for fever. HENT: Negative for ear pain, tinnitus and trouble swallowing. Eyes: Negative for photophobia and visual disturbance. Respiratory: Negative for choking and shortness of breath. Cardiovascular: Negative for chest pain and palpitations. Gastrointestinal: Negative for nausea and vomiting. Musculoskeletal: Negative for back pain, gait problem, joint swelling, myalgias, neck pain and neck stiffness. Skin: Negative for color change. Allergic/Immunologic: Negative for food allergies. Neurological: Negative for dizziness, tremors, seizures, syncope, facial asymmetry, speech difficulty, weakness, light-headedness, numbness and headaches. Psychiatric/Behavioral: Negative for behavioral problems, confusion, hallucinations and sleep disturbance. Objective:   BP (!) 142/88 (Site: Left Upper Arm, Position: Sitting, Cuff Size: Large Adult)   Pulse 90   Ht 6' 1\" (1.854 m)   Wt 240 lb (108.9 kg)   SpO2 98%   BMI 31.66 kg/m²     Physical Exam  Vitals reviewed. Eyes:      Pupils: Pupils are equal, round, and reactive to light. Cardiovascular:      Rate and Rhythm: Normal rate and regular rhythm. Heart sounds: No murmur heard. Pulmonary:      Effort: Pulmonary effort is normal.      Breath sounds: Normal breath sounds. Abdominal:      General: Bowel sounds are normal.   Musculoskeletal:         General: Normal range of motion. Cervical back: Normal range of motion. Skin:     General: Skin is warm. Neurological:      Mental Status: He is alert and oriented to person, place, and time. Cranial Nerves: No cranial nerve deficit. Sensory: No sensory deficit. Motor: No abnormal muscle tone. Coordination: Coordination normal.      Deep Tendon Reflexes: Reflexes are normal and symmetric. Babinski sign absent on the right side.  Babinski sign results found for: LITHIUM, DILFRTOT, VALPROATE    Assessment:       Diagnosis Orders   1. Chronic migraine without aura without status migrainosus, not intractable       Patient with chronic migraine without aura currently well controlled on Aimovig for preventative and Ubrelvy for acute treatment. Patient was previously having 3-10 migraine headache days per month, and has had greater than 50% reduction in frequency and severity of headaches since initiation of Aimovig 2 years ago. Has only had 2 migraine headaches in 2 years. Continues on [place] for rescue treatment which has efficacy. Patient is not a candidate for triptans given side effect of chest pain and shortness of breath. Fioricet does not have efficacy. To follow-up with Dr. Lolis Ortega in 1 year or sooner if new or worsening symptoms. Current medications:  Cj Jones    Previously tried medications preventative:  Propranolol- D/c due to efficacy  Topamax- Side effects, cognitive dysfunction  Amitriptyline- D/c due to efficacy      Previously tried medications acute:  Escig-no efficacy  Not a candidate for triptans- chest tightness    Previous imaging:  MRI brain w/+ w/o (5/1/14): negative MRI   Plan:      No orders of the defined types were placed in this encounter. No orders of the defined types were placed in this encounter. No follow-ups on file.       Hank Chaudhry PA-C

## 2021-11-12 ENCOUNTER — TELEPHONE (OUTPATIENT)
Dept: NEUROLOGY | Age: 46
End: 2021-11-12

## 2021-11-12 NOTE — TELEPHONE ENCOUNTER
Submitted PA via Cover my meds for aimovig 140mg    Your demographic data has been sent to [facility] successfully! [facility] typically takes 5-10 minutes to respond, but it may take a little longer in some cases. You will be notified by email when available. You can also check for an update later by opening this request from your dashboard. Please do not fax or call [facility] to resubmit this request. If you need assistance, please chat with [employer] or call us at [phone]. If it has been longer than 24 hours, please reach out to [facility].

## 2022-07-01 ENCOUNTER — TELEPHONE (OUTPATIENT)
Dept: NEUROLOGY | Age: 47
End: 2022-07-01

## 2022-07-01 NOTE — TELEPHONE ENCOUNTER
Your PA request has been closed. There is an active Prior Authorization approval on file until 11/15/2022. This medication was rejecting due to refill too soon, but the pharmacy now has a paid claim on 7/1/2022. This request is now closed.

## 2022-07-01 NOTE — TELEPHONE ENCOUNTER
Submitted PA via Cover my meds for aimovig 140mg      Your demographic data has been sent to [facility] successfully! [facility] typically takes 5-10 minutes to respond, but it may take a little longer in some cases. You will be notified by email when available. You can also check for an update later by opening this request from your dashboard. Please do not fax or call [facility] to resubmit this request. If you need assistance, please chat with [employer] or call us at [phone]. If it has been longer than 24 hours, please reach out to [facility].

## 2022-07-01 NOTE — TELEPHONE ENCOUNTER
Submitted response questions and sent last office note     Your information has been submitted to [facility]. To check for an updated outcome later, reopen this PA request from your dashboard. If [facility] has not responded to your request within 24 hours, contact [facility] at [phone]. If you think there may be a problem with your PA request, use our live chat feature at the bottom right.

## 2022-08-30 PROBLEM — G43.709 CHRONIC MIGRAINE WITHOUT AURA WITHOUT STATUS MIGRAINOSUS, NOT INTRACTABLE: Status: ACTIVE | Noted: 2022-08-30

## 2022-08-31 RX ORDER — ERENUMAB-AOOE 140 MG/ML
140 INJECTION, SOLUTION SUBCUTANEOUS
Qty: 1 PEN | Refills: 11 | Status: SHIPPED | OUTPATIENT
Start: 2022-08-31 | End: 2022-09-30

## 2022-09-09 ENCOUNTER — OFFICE VISIT (OUTPATIENT)
Dept: NEUROLOGY | Age: 47
End: 2022-09-09
Payer: MEDICAID

## 2022-09-09 VITALS
WEIGHT: 229 LBS | BODY MASS INDEX: 30.35 KG/M2 | HEIGHT: 73 IN | SYSTOLIC BLOOD PRESSURE: 110 MMHG | HEART RATE: 85 BPM | DIASTOLIC BLOOD PRESSURE: 82 MMHG

## 2022-09-09 DIAGNOSIS — G43.709 CHRONIC MIGRAINE WITHOUT AURA WITHOUT STATUS MIGRAINOSUS, NOT INTRACTABLE: Primary | ICD-10-CM

## 2022-09-09 DIAGNOSIS — G44.011 INTRACTABLE EPISODIC CLUSTER HEADACHE: ICD-10-CM

## 2022-09-09 PROCEDURE — G8417 CALC BMI ABV UP PARAM F/U: HCPCS | Performed by: PSYCHIATRY & NEUROLOGY

## 2022-09-09 PROCEDURE — 1036F TOBACCO NON-USER: CPT | Performed by: PSYCHIATRY & NEUROLOGY

## 2022-09-09 PROCEDURE — G8427 DOCREV CUR MEDS BY ELIG CLIN: HCPCS | Performed by: PSYCHIATRY & NEUROLOGY

## 2022-09-09 PROCEDURE — 99213 OFFICE O/P EST LOW 20 MIN: CPT | Performed by: PSYCHIATRY & NEUROLOGY

## 2022-09-09 RX ORDER — CHOLECALCIFEROL (VITAMIN D3) 1250 MCG
CAPSULE ORAL
COMMUNITY
Start: 2022-08-30

## 2022-09-09 RX ORDER — HYDRALAZINE HYDROCHLORIDE 100 MG/1
TABLET, FILM COATED ORAL
COMMUNITY
Start: 2022-08-30

## 2022-09-09 RX ORDER — PANTOPRAZOLE SODIUM 40 MG/1
TABLET, DELAYED RELEASE ORAL
COMMUNITY
Start: 2022-08-30

## 2022-09-09 ASSESSMENT — ENCOUNTER SYMPTOMS
TROUBLE SWALLOWING: 0
BACK PAIN: 0
COLOR CHANGE: 0
SHORTNESS OF BREATH: 0
VOMITING: 0
PHOTOPHOBIA: 0
NAUSEA: 0
CHOKING: 0

## 2022-09-09 NOTE — PROGRESS NOTES
Subjective:      Patient ID: Armando Lopes is a 42 y.o. male who presents today for:  Chief Complaint   Patient presents with    1 Year Follow Up     Migraine , patient states his migraines are better 1 to 2 a month. HPI 42 right-handed gentleman with a known history of chronic migraines without an aura with ophthalmic migraines as well. Patient appears to be doing well except for occasional clusters he is doing well on Aimovig with occasional use of Ubrelvy without any side effects. Prior to use of this patient's headache frequency was 3-10 migraine headaches a month down to 2 migraine headaches. He is not a candidate for triptans due to chest pain and shortness of breath  Patient had 1 episode in May where he lost vision for about 2 minutes. This did not go on to a migraine.     Past Medical History:   Diagnosis Date    Hypogonadism in male     Joint pain     Elbow, knee, shoulder and wrist    Migraines      Past Surgical History:   Procedure Laterality Date    COLONOSCOPY  7/30/2012    FOREIGN BODY REMOVAL      Metal from neck    UPPER GASTROINTESTINAL ENDOSCOPY  6/27/2012    hiatus hernia     Social History     Socioeconomic History    Marital status:      Spouse name: Not on file    Number of children: Not on file    Years of education: Not on file    Highest education level: Not on file   Occupational History    Not on file   Tobacco Use    Smoking status: Never    Smokeless tobacco: Never   Substance and Sexual Activity    Alcohol use: No    Drug use: No    Sexual activity: Yes     Partners: Female   Other Topics Concern    Not on file   Social History Narrative    Not on file     Social Determinants of Health     Financial Resource Strain: Not on file   Food Insecurity: Not on file   Transportation Needs: Not on file   Physical Activity: Not on file   Stress: Not on file   Social Connections: Not on file   Intimate Partner Violence: Not on file   Housing Stability: Not on file     Family History   Problem Relation Age of Onset    Diabetes Mother     High Blood Pressure Father      Allergies   Allergen Reactions    Sumatriptan        Current Outpatient Medications   Medication Sig Dispense Refill    Cholecalciferol (VITAMIN D3) 1.25 MG (34184 UT) CAPS Take 1 capsule weekly for 12 weeks      hydrALAZINE (APRESOLINE) 100 MG tablet TAKE 1 TABLET BY MOUTH TWICE DAILY      pantoprazole (PROTONIX) 40 MG tablet TAKE 1 TABLET Twice daily 1/2 hour prior to breakfast and dinner      AIMOVIG 140 MG/ML SOAJ Inject 140 mg into the skin every 30 days 1 pen 11    vitamin D (ERGOCALCIFEROL) 1.25 MG (04978 UT) CAPS capsule TAKE 1 CAPSULE BY MOUTH weekly      spironolactone (ALDACTONE) 25 MG tablet TAKE 1 TABLET BY MOUTH EVERY MORNING      Ubrogepant (UBRELVY) 100 MG TABS Take 100 mg by mouth daily as needed (headache) 10 tablet 11    losartan (COZAAR) 50 MG tablet 50 mg      SYRINGE-NEEDLE, DISP, 3 ML (B-D 3CC LUER-MELANY SYR 90OC2-2/2) 22G X 1-1/2\" 3 ML MISC Use as needed 20 each 0    chlorhexidine (PERIDEX) 0.12 % solution RINSE MOUTH WITH 15 ML TWICE DAILY  3    amLODIPine (NORVASC) 5 MG tablet Take 1 tablet by mouth daily 30 tablet 3    testosterone (ANDROGEL; TESTIM) 50 MG/5GM (1%) GEL 1% gel apply 2 (TWO) packets topically EVERY DAY (Patient not taking: Reported on 9/9/2022) 300 g 2    testosterone (ANDROGEL) 25 MG/2.5GM (1%) GEL 1 % gel Apply 5 g topically daily for 30 days. Use 2 daily (Patient not taking: Reported on 9/9/2022) 60 Package 3    Testosterone (ANDRODERM) 4 MG/24HR PT24 Use 1 patch daily (Patient not taking: Reported on 9/9/2022) 30 patch 2    testosterone cypionate (DEPOTESTOTERONE CYPIONATE) 200 MG/ML injection inject 1ml intramuscularly once every 10 days (Patient not taking: Reported on 9/9/2022) 10 mL 2     No current facility-administered medications for this visit. Review of Systems   Constitutional:  Negative for fever. HENT:  Negative for ear pain, tinnitus and trouble swallowing. Eyes:  Negative for photophobia and visual disturbance. Respiratory:  Negative for choking and shortness of breath. Cardiovascular:  Negative for chest pain and palpitations. Gastrointestinal:  Negative for nausea and vomiting. Musculoskeletal:  Negative for back pain, gait problem, joint swelling, myalgias, neck pain and neck stiffness. Skin:  Negative for color change. Allergic/Immunologic: Negative for food allergies. Neurological:  Negative for dizziness, tremors, seizures, syncope, facial asymmetry, speech difficulty, weakness, light-headedness, numbness and headaches. Psychiatric/Behavioral:  Negative for behavioral problems, confusion, hallucinations and sleep disturbance. Objective:   /82 (Site: Left Upper Arm, Position: Sitting, Cuff Size: Medium Adult)   Pulse 85   Ht 6' 1\" (1.854 m)   Wt 229 lb (103.9 kg)   BMI 30.21 kg/m²     Physical Exam  Vitals reviewed. Eyes:      Pupils: Pupils are equal, round, and reactive to light. Cardiovascular:      Rate and Rhythm: Normal rate and regular rhythm. Heart sounds: No murmur heard. Pulmonary:      Effort: Pulmonary effort is normal.      Breath sounds: Normal breath sounds. Abdominal:      General: Bowel sounds are normal.   Musculoskeletal:         General: Normal range of motion. Cervical back: Normal range of motion. Skin:     General: Skin is warm. Neurological:      Mental Status: He is alert and oriented to person, place, and time. Cranial Nerves: No cranial nerve deficit. Sensory: No sensory deficit. Motor: No abnormal muscle tone. Coordination: Coordination normal.      Deep Tendon Reflexes: Reflexes are normal and symmetric. Babinski sign absent on the right side. Babinski sign absent on the left side.    Psychiatric:         Mood and Affect: Mood normal.       MRI ABDOMEN W WO CONTRAST    Result Date: 12/10/2019  MRI OF THE ABDOMEN, ATTENTION LEFT RENAL CYST, BEFORE AND AFTER CONTRAST ENHANCEMENT INDICATION: Follow-up left renal lesion TECHNIQUE: Multiplanar, multi-sequence MRI was performed on a 1.0 open magnet, including imaging before and after intravenous administration of 20 mL MultiHance. COMPARISON:   April 1, 2019 MRI and CT performed at [address] [place] [address], [address] FINDINGS: There is an unchanged 3 cm long, 2.8 cm diameter sharply circumscribed homogenous, T2 hyperintense, T1 hypointense, nonenhancing parapelvic lesion within the left kidney. Subtraction images show no sign of tumor vascularity. The right kidney remains unremarkable. No significant incidental abnormalities are seen within the field-of-view. 1.   BENIGN-APPEARING, BENIGN BEHAVING PARAPELVIC CYST WITHIN THE LEFT KIDNEY COMPARED TO APRIL 1, 2019 EXAMINATION PERFORMED ELSEWHERE. 2.  NO SIGNIFICANT INCIDENTAL FINDINGS WITHIN THE FIELD-OF-VIEW.       Lab Results   Component Value Date/Time    WBC 6.0 08/10/2020 09:25 AM    RBC 5.09 08/10/2020 09:25 AM    RBC 4.87 09/10/2018 10:10 AM    HGB 16.1 08/10/2020 09:25 AM    HCT 50.8 08/10/2020 09:25 AM     08/10/2020 09:25 AM    MCH 31.8 09/10/2018 10:10 AM    MCHC 31.7 08/10/2020 09:25 AM     08/10/2020 09:25 AM    MPV 11.1 09/10/2018 10:10 AM     Lab Results   Component Value Date/Time     09/10/2018 10:10 AM    K 4.6 09/10/2018 10:10 AM     09/10/2018 10:10 AM    CREATININE 0.96 09/10/2018 10:10 AM    LABGLOM >60 09/10/2018 10:10 AM    GLUCOSE 95 09/10/2018 10:10 AM    CALCIUM 9.1 09/10/2018 10:10 AM     No results found for: PROTIME, INR  No results found for: TSH, BRYCNDUT79, FOLATE, FERRITIN, IRON, TIBC, PTRFSAT, RETICCOUNT, TSH, FREET4  Lab Results   Component Value Date/Time    TRIG 199 04/14/2015 12:00 AM    HDL 36 04/14/2015 12:00 AM    LDLCALC 106 04/14/2015 12:00 AM     No results found for: [place], LABBENZ, CANNAB, COCAINESCRN, LABMETH, OPIATESCREENURINE, PHENCYCLIDINESCREENURINE, PPXUR, ETOH  No results found for: LITHIUM, DILFRTOT, VALPROATE    Assessment:       Diagnosis Orders   1. Chronic migraine without aura without status migrainosus, not intractable        2. Intractable episodic cluster headache          Intractable cluster migraines which have now responded very well to [address]. Patient also takes [place] as and when required though in the last year he has only had 2 or 3 full-blown migraine headaches. He does have some ocular auras at times which do not go on to a headache and is much better than he was prior to starting the medications. Patient does have occasional loss of vision when this occurs and there is definitely ocular Migraines or acephalic migraines. We will continue him on Aimovig for now      Flaquitoarslan Campos MD, Chaz Bell, American Board of Psychiatry & Neurology  Board Certified in Vascular Neurology  Board Certified in Neuromuscular Medicine  Certified in [address] [place] [address] Ave:      No orders of the defined types were placed in this encounter. No orders of the defined types were placed in this encounter. No follow-ups on file.       Zenon Baptiste MD

## 2022-10-04 ENCOUNTER — TELEPHONE (OUTPATIENT)
Dept: NEUROLOGY | Age: 47
End: 2022-10-04

## 2022-10-04 NOTE — TELEPHONE ENCOUNTER
Submitted PA via Cover my meds for aimovig 140mg      Your PA has been faxed to the plan as a paper copy. Please contact the plan directly if you haven't received a determination in a typical timeframe. You will be notified of the determination electronically and via fax.     Contact plan to follow up on [facility] [place]

## 2023-08-21 RX ORDER — ERENUMAB-AOOE 140 MG/ML
140 INJECTION, SOLUTION SUBCUTANEOUS
Qty: 1 ML | Refills: 11 | Status: SHIPPED | OUTPATIENT
Start: 2023-08-21 | End: 2023-09-20

## 2023-08-21 NOTE — TELEPHONE ENCOUNTER
Pharmacy is requesting medication refill.  Please approve or deny this request.    Rx requested:  Requested Prescriptions     Pending Prescriptions Disp Refills    AIMOVIG 140 MG/ML SOAJ [Pharmacy Med Name: Aimovig Autoinjector 140 mg/mL subcutaneous auto-injector] 1 mL 11     Sig: Inject 140 mg into the skin every 30 days         Last Office Visit:   Visit date not found      Next Visit Date:  Future Appointments   Date Time Provider [address] [place] [address]   9/8/2023  8:15 AM Alice Cid MD [facility] Neurology -

## 2023-08-25 LAB
ALANINE AMINOTRANSFERASE (SGPT) (U/L) IN SER/PLAS: 21 U/L (ref 10–52)
ALBUMIN (G/DL) IN SER/PLAS: 4.5 G/DL (ref 3.4–5)
ALKALINE PHOSPHATASE (U/L) IN SER/PLAS: 62 U/L (ref 33–120)
ANION GAP IN SER/PLAS: 11 MMOL/L (ref 10–20)
APPEARANCE, URINE: CLEAR
ASPARTATE AMINOTRANSFERASE (SGOT) (U/L) IN SER/PLAS: 16 U/L (ref 9–39)
BASOPHILS (10*3/UL) IN BLOOD BY AUTOMATED COUNT: 0.04 X10E9/L (ref 0–0.1)
BASOPHILS/100 LEUKOCYTES IN BLOOD BY AUTOMATED COUNT: 0.8 % (ref 0–2)
BILIRUBIN TOTAL (MG/DL) IN SER/PLAS: 0.6 MG/DL (ref 0–1.2)
BILIRUBIN, URINE: NEGATIVE
BLOOD, URINE: NEGATIVE
CALCIDIOL (25 OH VITAMIN D3) (NG/ML) IN SER/PLAS: 30 NG/ML
CALCIUM (MG/DL) IN SER/PLAS: 9.2 MG/DL (ref 8.6–10.3)
CARBON DIOXIDE, TOTAL (MMOL/L) IN SER/PLAS: 26 MMOL/L (ref 21–32)
CHLORIDE (MMOL/L) IN SER/PLAS: 107 MMOL/L (ref 98–107)
CHOLESTEROL (MG/DL) IN SER/PLAS: 183 MG/DL (ref 0–199)
CHOLESTEROL IN HDL (MG/DL) IN SER/PLAS: 39.2 MG/DL
CHOLESTEROL/HDL RATIO: 4.7
COLOR, URINE: YELLOW
CREATININE (MG/DL) IN SER/PLAS: 1.04 MG/DL (ref 0.5–1.3)
EOSINOPHILS (10*3/UL) IN BLOOD BY AUTOMATED COUNT: 0.11 X10E9/L (ref 0–0.7)
EOSINOPHILS/100 LEUKOCYTES IN BLOOD BY AUTOMATED COUNT: 2.2 % (ref 0–6)
ERYTHROCYTE DISTRIBUTION WIDTH (RATIO) BY AUTOMATED COUNT: 12.8 % (ref 11.5–14.5)
ERYTHROCYTE MEAN CORPUSCULAR HEMOGLOBIN CONCENTRATION (G/DL) BY AUTOMATED: 32.7 G/DL (ref 32–36)
ERYTHROCYTE MEAN CORPUSCULAR VOLUME (FL) BY AUTOMATED COUNT: 97 FL (ref 80–100)
ERYTHROCYTES (10*6/UL) IN BLOOD BY AUTOMATED COUNT: 4.61 X10E12/L (ref 4.5–5.9)
ESTIMATED AVERAGE GLUCOSE FOR HBA1C: 103 MG/DL
GFR MALE: 90 ML/MIN/1.73M2
GLUCOSE (MG/DL) IN SER/PLAS: 94 MG/DL (ref 74–99)
GLUCOSE, URINE: NEGATIVE MG/DL
HEMATOCRIT (%) IN BLOOD BY AUTOMATED COUNT: 44.9 % (ref 41–52)
HEMOGLOBIN (G/DL) IN BLOOD: 14.7 G/DL (ref 13.5–17.5)
HEMOGLOBIN A1C/HEMOGLOBIN TOTAL IN BLOOD: 5.2 %
IMMATURE GRANULOCYTES/100 LEUKOCYTES IN BLOOD BY AUTOMATED COUNT: 0.4 % (ref 0–0.9)
KETONES, URINE: NEGATIVE MG/DL
LDL: 99 MG/DL (ref 0–99)
LEUKOCYTE ESTERASE, URINE: NEGATIVE
LEUKOCYTES (10*3/UL) IN BLOOD BY AUTOMATED COUNT: 5.1 X10E9/L (ref 4.4–11.3)
LYMPHOCYTES (10*3/UL) IN BLOOD BY AUTOMATED COUNT: 1.48 X10E9/L (ref 1.2–4.8)
LYMPHOCYTES/100 LEUKOCYTES IN BLOOD BY AUTOMATED COUNT: 29 % (ref 13–44)
MONOCYTES (10*3/UL) IN BLOOD BY AUTOMATED COUNT: 0.47 X10E9/L (ref 0.1–1)
MONOCYTES/100 LEUKOCYTES IN BLOOD BY AUTOMATED COUNT: 9.2 % (ref 2–10)
NEUTROPHILS (10*3/UL) IN BLOOD BY AUTOMATED COUNT: 2.98 X10E9/L (ref 1.2–7.7)
NEUTROPHILS/100 LEUKOCYTES IN BLOOD BY AUTOMATED COUNT: 58.4 % (ref 40–80)
NITRITE, URINE: NEGATIVE
NON HDL CHOLESTEROL: 144 MG/DL
PH, URINE: 5 (ref 5–8)
PLATELETS (10*3/UL) IN BLOOD AUTOMATED COUNT: 233 X10E9/L (ref 150–450)
POTASSIUM (MMOL/L) IN SER/PLAS: 4.3 MMOL/L (ref 3.5–5.3)
PROTEIN TOTAL: 7.1 G/DL (ref 6.4–8.2)
PROTEIN, URINE: NEGATIVE MG/DL
SODIUM (MMOL/L) IN SER/PLAS: 140 MMOL/L (ref 136–145)
SPECIFIC GRAVITY, URINE: 1.02 (ref 1–1.03)
THYROTROPIN (MIU/L) IN SER/PLAS BY DETECTION LIMIT <= 0.05 MIU/L: 1.73 MIU/L (ref 0.44–3.98)
TRIGLYCERIDE (MG/DL) IN SER/PLAS: 224 MG/DL (ref 0–149)
UREA NITROGEN (MG/DL) IN SER/PLAS: 19 MG/DL (ref 6–23)
UROBILINOGEN, URINE: <2 MG/DL (ref 0–1.9)
VLDL: 45 MG/DL (ref 0–40)

## 2023-08-31 LAB
TESTOSTERONE FREE (CHAN): 61.2 PG/ML (ref 35–155)
TESTOSTERONE,TOTAL,LC-MS/MS: 226 NG/DL (ref 250–1100)

## 2023-09-08 ENCOUNTER — OFFICE VISIT (OUTPATIENT)
Dept: NEUROLOGY | Age: 48
End: 2023-09-08
Payer: MEDICAID

## 2023-09-08 VITALS
DIASTOLIC BLOOD PRESSURE: 88 MMHG | WEIGHT: 232.8 LBS | HEART RATE: 97 BPM | BODY MASS INDEX: 30.71 KG/M2 | SYSTOLIC BLOOD PRESSURE: 136 MMHG

## 2023-09-08 DIAGNOSIS — G44.011 INTRACTABLE EPISODIC CLUSTER HEADACHE: ICD-10-CM

## 2023-09-08 DIAGNOSIS — G43.709 CHRONIC MIGRAINE WITHOUT AURA WITHOUT STATUS MIGRAINOSUS, NOT INTRACTABLE: Primary | ICD-10-CM

## 2023-09-08 PROCEDURE — 99213 OFFICE O/P EST LOW 20 MIN: CPT | Performed by: PSYCHIATRY & NEUROLOGY

## 2023-09-08 PROCEDURE — 1036F TOBACCO NON-USER: CPT | Performed by: PSYCHIATRY & NEUROLOGY

## 2023-09-08 PROCEDURE — G8427 DOCREV CUR MEDS BY ELIG CLIN: HCPCS | Performed by: PSYCHIATRY & NEUROLOGY

## 2023-09-08 PROCEDURE — 3078F DIAST BP <80 MM HG: CPT | Performed by: PSYCHIATRY & NEUROLOGY

## 2023-09-08 PROCEDURE — G8417 CALC BMI ABV UP PARAM F/U: HCPCS | Performed by: PSYCHIATRY & NEUROLOGY

## 2023-09-08 PROCEDURE — 3074F SYST BP LT 130 MM HG: CPT | Performed by: PSYCHIATRY & NEUROLOGY

## 2023-09-08 ASSESSMENT — ENCOUNTER SYMPTOMS
CHOKING: 0
VOMITING: 0
SHORTNESS OF BREATH: 0
NAUSEA: 0
BACK PAIN: 0
COLOR CHANGE: 0
PHOTOPHOBIA: 0
TROUBLE SWALLOWING: 0

## 2023-09-08 NOTE — PROGRESS NOTES
Subjective:      Patient ID: Fede Mckeon is a 41 y.o. male who presents today for:  Chief Complaint   Patient presents with    Follow-up     Pt states that he is being watched for his BP. He states that his migraines are either in one eye or the other never both. He states that they are never full blown episode. He states that he is doing better. HPI 41 right-handed man with a known history of migraine headaches. .  Patient is on Aimovig. He has done quite well with  Aimovig and has Ubrelvy if needed. His headaches are better  but not completely gone Is more headaches either eye but not both and has not had a full-blown migraine. Actually he  today is more concerned about his daughter was following the same pattern of his headaches.   She is under age for us and cannot be treated here  Past Medical History:   Diagnosis Date    Hypogonadism in male     Joint pain     Elbow, knee, shoulder and wrist    Migraines      Past Surgical History:   Procedure Laterality Date    COLONOSCOPY  7/30/2012    FOREIGN BODY REMOVAL      Metal from neck    UPPER GASTROINTESTINAL ENDOSCOPY  6/27/2012    hiatus hernia     Social History     Socioeconomic History    Marital status:      Spouse name: Not on file    Number of children: Not on file    Years of education: Not on file    Highest education level: Not on file   Occupational History    Not on file   Tobacco Use    Smoking status: Never    Smokeless tobacco: Never   Substance and Sexual Activity    Alcohol use: No    Drug use: No    Sexual activity: Yes     Partners: Female   Other Topics Concern    Not on file   Social History Narrative    Not on file     Social Determinants of Health     Financial Resource Strain: Not on file   Food Insecurity: Not on file   Transportation Needs: Not on file   Physical Activity: Not on file   Stress: Not on file   Social Connections: Not on file   Intimate Partner Violence: Not on file   Housing Stability: Not on file

## 2023-09-14 RX ORDER — ERENUMAB-AOOE 140 MG/ML
140 INJECTION, SOLUTION SUBCUTANEOUS
Qty: 1 ML | Refills: 11 | Status: SHIPPED | OUTPATIENT
Start: 2023-09-14 | End: 2023-10-14

## 2023-12-19 ENCOUNTER — OFFICE VISIT (OUTPATIENT)
Dept: ORTHOPEDIC SURGERY | Facility: CLINIC | Age: 48
End: 2023-12-19
Payer: COMMERCIAL

## 2023-12-19 DIAGNOSIS — S83.282A ACUTE LATERAL MENISCUS TEAR OF LEFT KNEE, INITIAL ENCOUNTER: Primary | ICD-10-CM

## 2023-12-19 PROCEDURE — 99214 OFFICE O/P EST MOD 30 MIN: CPT | Performed by: ORTHOPAEDIC SURGERY

## 2023-12-19 PROCEDURE — 1036F TOBACCO NON-USER: CPT | Performed by: ORTHOPAEDIC SURGERY

## 2023-12-19 RX ORDER — ASPIRIN 325 MG
TABLET, DELAYED RELEASE (ENTERIC COATED) ORAL
COMMUNITY
Start: 2022-03-21

## 2023-12-19 RX ORDER — ERENUMAB-AOOE 70 MG/ML
INJECTION SUBCUTANEOUS
COMMUNITY
Start: 2018-05-01

## 2023-12-19 ASSESSMENT — PAIN - FUNCTIONAL ASSESSMENT: PAIN_FUNCTIONAL_ASSESSMENT: 0-10

## 2023-12-19 ASSESSMENT — PAIN SCALES - GENERAL
PAINLEVEL_OUTOF10: 7
PAINLEVEL_OUTOF10: 0 - NO PAIN

## 2023-12-19 NOTE — PROGRESS NOTES
History of Present Illness   Chief Complaint   Patient presents with    Left Knee - Follow-up     MRI done 12-13-23 @ [facility]       Patient returns today to review side: left Knee MRI.  The patient endorses persistent knee pain and persistent mechanical symptoms including locking, catching, and giving out.  These issues are refractory to multiple non-surgical treatments.  His history is outlined in previous dictations.  He underwent a previous ACL reconstruction by me.  He then had a manipulation in the lateral meniscectomy.  He complains of persistent lateral pain.  He sought 2 opinions.  The first opinion rule out neuropathic process.  The second order for the MRI.  He is here for the results of this.    He complains of sharp lateral pain.  He has pain with stairs as well as axially loading the knee from 20 degrees flexion to full extension.      History reviewed. No pertinent past medical history.    Medication Documentation Review Audit       Reviewed by Katherine Langston on 12/19/23 at 0908      Medication Order Taking? Sig Documenting Provider Last Dose Status   cholecalciferol (Vitamin D-3) 50,000 unit capsule [id number] Yes Take 1 capsule weekly for 12 weeks Historical Provider, MD  Active   erenumab (Aimovig Autoinjector) 70 mg/mL injection [id number] Yes  Historical Provider, MD  Active                    Allergies   Allergen Reactions    Flexeril [Cyclobenzaprine] Rash       Social History     Socioeconomic History    Marital status:      Spouse name: Not on file    Number of children: Not on file    Years of education: Not on file    Highest education level: Not on file   Occupational History    Not on file   Tobacco Use    Smoking status: Never    Smokeless tobacco: Never   Substance and Sexual Activity    Alcohol use: Not on file    Drug use: Not on file    Sexual activity: Not on file   Other Topics Concern    Not on file   Social History Narrative    Not on file     Social Determinants of Health      Financial Resource Strain: Not on file   Food Insecurity: Not on file   Transportation Needs: Not on file   Physical Activity: Not on file   Stress: Not on file   Social Connections: Not on file   Intimate Partner Violence: Not on file   Housing Stability: Not on file       Past Surgical History:   Procedure Laterality Date    OTHER SURGICAL HISTORY  12/04/2021    Colonoscopy    OTHER SURGICAL HISTORY  12/04/2021    Neck surgery    OTHER SURGICAL HISTORY  08/09/2022    Anterior cruciate ligament repair    OTHER SURGICAL HISTORY  08/09/2022    Meniscus repair         Smoking  No       Review of Systems   GENERAL: Negative for malaise, significant weight loss, fever  MUSCULOSKELETAL: See HPI  NEURO:  Negative for numbness / tingling      Physical Exam  side: left Knee:  Skin healthy and intact  No gross varus or valgus alignment   Range of motion: no major deficits   Tenderness to palpation over joint line: lateral  No laxity to valgus stress  No laxity to varus stress  Negative Lachman´s test  Negative anterior drawer test  Negative posterior drawer test  Positive Madiha´s test  Neurovascular exam normal distally     Imaging  MRI of the left knee from the [facility] shows a tear of the junction between the body and anterior horn of the lateral meniscus.  There is chondromalacia has progressed in the lateral compartment.  There is a cyst ACL graft.         Assessment:    Patient with a side: left knee lateral meniscal tear.     Plan:  We discussed arthroscopy versus nonsurgical treatment for the patient´s meniscal tear.  We reviewed the blood supply to the meniscus, limited healing potential and meniscectomy versus meniscal repair.  The risks of the procedure were mentioned, including wound issues, deep venous thrombosis, pulmonary embolism and medical complications.  The anticipated timeframe for recovery and return to activity were outlined.  We discussed formal physical therapy versus home exercise  program.     We mentioned the possibility of incomplete relief of pain, particularly if any chondral defects are encountered and the possibility of arthrosis of the knee related to long-term deficiencies of the meniscus.   Given that he has failed conservative treatment function with this pain I offered another knee arthroscopy and partial lateral meniscectomy with joint debridement.  We discussed the procedure along with the risks, benefits alternatives being reviewed.  We discussed the recovery.  As well.                            Allergies   Allergen Reactions    Flexeril [Cyclobenzaprine] Rash       REVIEW OF SYSTEMS  General: Negative for malaise, significant weight loss, fever  Musculoskeletal: See HPI  Neuro:  Negative for numbness/tingling      Medication Documentation Review Audit       Reviewed by Katherine Langston on 12/19/23 at 0908      Medication Order Taking? Sig Documenting Provider Last Dose Status   cholecalciferol (Vitamin D-3) 50,000 unit capsule [id number] Yes Take 1 capsule weekly for 12 weeks Historical Provider, MD  Active   erenumab (Aimovig Autoinjector) 70 mg/mL injection [id number] Yes  Historical Provider, MD  Active                    PHYSICAL EXAM  General Appearance/Mental Status: A&Ox3, no apparent distress  HEENT: Normal  Lungs: Clear  Heart: RRR  Abdomen: Soft, nontender  Extremities: Abnormal left knee lateral meniscus tear.

## 2023-12-19 NOTE — H&P (VIEW-ONLY)
History of Present Illness   Chief Complaint   Patient presents with    Left Knee - Follow-up     MRI done 12-13-23 @ [facility]       Patient returns today to review side: left Knee MRI.  The patient endorses persistent knee pain and persistent mechanical symptoms including locking, catching, and giving out.  These issues are refractory to multiple non-surgical treatments.  His history is outlined in previous dictations.  He underwent a previous ACL reconstruction by me.  He then had a manipulation in the lateral meniscectomy.  He complains of persistent lateral pain.  He sought 2 opinions.  The first opinion rule out neuropathic process.  The second order for the MRI.  He is here for the results of this.    He complains of sharp lateral pain.  He has pain with stairs as well as axially loading the knee from 20 degrees flexion to full extension.      History reviewed. No pertinent past medical history.    Medication Documentation Review Audit       Reviewed by Katherine Langston on 12/19/23 at 0908      Medication Order Taking? Sig Documenting Provider Last Dose Status   cholecalciferol (Vitamin D-3) 50,000 unit capsule [id number] Yes Take 1 capsule weekly for 12 weeks Historical Provider, MD  Active   erenumab (Aimovig Autoinjector) 70 mg/mL injection [id number] Yes  Historical Provider, MD  Active                    Allergies   Allergen Reactions    Flexeril [Cyclobenzaprine] Rash       Social History     Socioeconomic History    Marital status:      Spouse name: Not on file    Number of children: Not on file    Years of education: Not on file    Highest education level: Not on file   Occupational History    Not on file   Tobacco Use    Smoking status: Never    Smokeless tobacco: Never   Substance and Sexual Activity    Alcohol use: Not on file    Drug use: Not on file    Sexual activity: Not on file   Other Topics Concern    Not on file   Social History Narrative    Not on file     Social Determinants of Health      Financial Resource Strain: Not on file   Food Insecurity: Not on file   Transportation Needs: Not on file   Physical Activity: Not on file   Stress: Not on file   Social Connections: Not on file   Intimate Partner Violence: Not on file   Housing Stability: Not on file       Past Surgical History:   Procedure Laterality Date    OTHER SURGICAL HISTORY  12/04/2021    Colonoscopy    OTHER SURGICAL HISTORY  12/04/2021    Neck surgery    OTHER SURGICAL HISTORY  08/09/2022    Anterior cruciate ligament repair    OTHER SURGICAL HISTORY  08/09/2022    Meniscus repair         Smoking  No       Review of Systems   GENERAL: Negative for malaise, significant weight loss, fever  MUSCULOSKELETAL: See HPI  NEURO:  Negative for numbness / tingling      Physical Exam  side: left Knee:  Skin healthy and intact  No gross varus or valgus alignment   Range of motion: no major deficits   Tenderness to palpation over joint line: lateral  No laxity to valgus stress  No laxity to varus stress  Negative Lachman´s test  Negative anterior drawer test  Negative posterior drawer test  Positive Madiha´s test  Neurovascular exam normal distally     Imaging  MRI of the left knee from the [facility] shows a tear of the junction between the body and anterior horn of the lateral meniscus.  There is chondromalacia has progressed in the lateral compartment.  There is a cyst ACL graft.         Assessment:    Patient with a side: left knee lateral meniscal tear.     Plan:  We discussed arthroscopy versus nonsurgical treatment for the patient´s meniscal tear.  We reviewed the blood supply to the meniscus, limited healing potential and meniscectomy versus meniscal repair.  The risks of the procedure were mentioned, including wound issues, deep venous thrombosis, pulmonary embolism and medical complications.  The anticipated timeframe for recovery and return to activity were outlined.  We discussed formal physical therapy versus home exercise  program.     We mentioned the possibility of incomplete relief of pain, particularly if any chondral defects are encountered and the possibility of arthrosis of the knee related to long-term deficiencies of the meniscus.   Given that he has failed conservative treatment function with this pain I offered another knee arthroscopy and partial lateral meniscectomy with joint debridement.  We discussed the procedure along with the risks, benefits alternatives being reviewed.  We discussed the recovery.  As well.                            Allergies   Allergen Reactions    Flexeril [Cyclobenzaprine] Rash       REVIEW OF SYSTEMS  General: Negative for malaise, significant weight loss, fever  Musculoskeletal: See HPI  Neuro:  Negative for numbness/tingling      Medication Documentation Review Audit       Reviewed by Katherine Langston on 12/19/23 at 0908      Medication Order Taking? Sig Documenting Provider Last Dose Status   cholecalciferol (Vitamin D-3) 50,000 unit capsule [id number] Yes Take 1 capsule weekly for 12 weeks Historical Provider, MD  Active   erenumab (Aimovig Autoinjector) 70 mg/mL injection [id number] Yes  Historical Provider, MD  Active                    PHYSICAL EXAM  General Appearance/Mental Status: A&Ox3, no apparent distress  HEENT: Normal  Lungs: Clear  Heart: RRR  Abdomen: Soft, nontender  Extremities: Abnormal left knee lateral meniscus tear.

## 2023-12-22 PROBLEM — E55.9 VITAMIN D DEFICIENCY: Status: ACTIVE | Noted: 2018-09-10

## 2023-12-22 PROBLEM — K76.0 FATTY LIVER: Status: ACTIVE | Noted: 2023-12-22

## 2023-12-22 PROBLEM — K21.00 GASTROESOPHAGEAL REFLUX DISEASE WITH ESOPHAGITIS WITHOUT HEMORRHAGE: Status: ACTIVE | Noted: 2023-12-22

## 2023-12-22 PROBLEM — J01.01 ACUTE RECURRENT MAXILLARY SINUSITIS: Status: ACTIVE | Noted: 2023-12-22

## 2023-12-22 PROBLEM — M25.571 ANKLE PAIN, RIGHT: Status: ACTIVE | Noted: 2023-12-22

## 2023-12-22 PROBLEM — N28.1 RENAL CYST: Status: ACTIVE | Noted: 2023-12-22

## 2023-12-22 PROBLEM — H10.33 ACUTE BACTERIAL CONJUNCTIVITIS OF BOTH EYES: Status: ACTIVE | Noted: 2023-12-22

## 2023-12-22 PROBLEM — S83.282A OTHER TEAR OF LATERAL MENISCUS, CURRENT INJURY, LEFT KNEE, INITIAL ENCOUNTER: Status: ACTIVE | Noted: 2023-12-22

## 2023-12-22 PROBLEM — I10 HYPERTENSION: Status: ACTIVE | Noted: 2023-12-22

## 2023-12-22 PROBLEM — M23.92 INTERNAL DERANGEMENT OF LEFT KNEE: Status: ACTIVE | Noted: 2023-12-22

## 2023-12-22 PROBLEM — J00 NASOPHARYNGITIS: Status: ACTIVE | Noted: 2023-12-22

## 2023-12-22 PROBLEM — R79.89 LOW TESTOSTERONE LEVEL IN MALE: Status: ACTIVE | Noted: 2023-12-22

## 2023-12-22 PROBLEM — M94.262 CHONDROMALACIA, LEFT KNEE: Status: ACTIVE | Noted: 2023-12-22

## 2023-12-22 RX ORDER — NABUMETONE 750 MG/1
750 TABLET, FILM COATED ORAL DAILY
COMMUNITY
Start: 2023-09-08 | End: 2024-01-03 | Stop reason: WASHOUT

## 2023-12-22 RX ORDER — PANTOPRAZOLE SODIUM 40 MG/1
40 TABLET, DELAYED RELEASE ORAL AS NEEDED
COMMUNITY

## 2023-12-22 RX ORDER — POLYMYXIN B SULFATE AND TRIMETHOPRIM 1; 10000 MG/ML; [USP'U]/ML
3 SOLUTION OPHTHALMIC 4 TIMES DAILY
COMMUNITY
Start: 2023-09-12 | End: 2024-01-03 | Stop reason: WASHOUT

## 2023-12-22 RX ORDER — PREDNISONE 20 MG/1
20 TABLET ORAL 2 TIMES DAILY
COMMUNITY
Start: 2023-09-12 | End: 2024-01-03 | Stop reason: WASHOUT

## 2023-12-22 RX ORDER — AMOXICILLIN 875 MG/1
875 TABLET, FILM COATED ORAL EVERY 12 HOURS
COMMUNITY
Start: 2023-09-12 | End: 2024-01-03 | Stop reason: WASHOUT

## 2023-12-22 RX ORDER — HYDRALAZINE HYDROCHLORIDE 100 MG/1
100 TABLET, FILM COATED ORAL 2 TIMES DAILY
COMMUNITY

## 2024-01-03 ENCOUNTER — HOSPITAL ENCOUNTER (OUTPATIENT)
Dept: CARDIOLOGY | Facility: HOSPITAL | Age: 49
Discharge: HOME | End: 2024-01-03
Payer: COMMERCIAL

## 2024-01-03 ENCOUNTER — OFFICE VISIT (OUTPATIENT)
Dept: PRIMARY CARE | Facility: CLINIC | Age: 49
End: 2024-01-03
Payer: COMMERCIAL

## 2024-01-03 VITALS
SYSTOLIC BLOOD PRESSURE: 138 MMHG | OXYGEN SATURATION: 93 % | TEMPERATURE: 98.2 F | DIASTOLIC BLOOD PRESSURE: 86 MMHG | WEIGHT: 216.25 LBS | HEART RATE: 95 BPM | BODY MASS INDEX: 28.66 KG/M2 | HEIGHT: 73 IN

## 2024-01-03 DIAGNOSIS — Z01.818 PREOP TESTING: ICD-10-CM

## 2024-01-03 DIAGNOSIS — D64.9 ANEMIA, UNSPECIFIED TYPE: ICD-10-CM

## 2024-01-03 DIAGNOSIS — M94.262 CHONDROMALACIA, LEFT KNEE: ICD-10-CM

## 2024-01-03 DIAGNOSIS — S83.282A OTHER TEAR OF LATERAL MENISCUS, CURRENT INJURY, LEFT KNEE, INITIAL ENCOUNTER: ICD-10-CM

## 2024-01-03 DIAGNOSIS — M23.92 INTERNAL DERANGEMENT OF LEFT KNEE: ICD-10-CM

## 2024-01-03 DIAGNOSIS — S83.282A OTHER TEAR OF LATERAL MENISCUS, CURRENT INJURY, LEFT KNEE, INITIAL ENCOUNTER: Primary | ICD-10-CM

## 2024-01-03 PROBLEM — H10.33 ACUTE BACTERIAL CONJUNCTIVITIS OF BOTH EYES: Status: RESOLVED | Noted: 2023-12-22 | Resolved: 2024-01-03

## 2024-01-03 PROBLEM — M25.571 ANKLE PAIN, RIGHT: Status: RESOLVED | Noted: 2023-12-22 | Resolved: 2024-01-03

## 2024-01-03 PROCEDURE — 93010 ELECTROCARDIOGRAM REPORT: CPT | Performed by: INTERNAL MEDICINE

## 2024-01-03 PROCEDURE — 1036F TOBACCO NON-USER: CPT | Performed by: INTERNAL MEDICINE

## 2024-01-03 PROCEDURE — 3079F DIAST BP 80-89 MM HG: CPT | Performed by: INTERNAL MEDICINE

## 2024-01-03 PROCEDURE — 99214 OFFICE O/P EST MOD 30 MIN: CPT | Performed by: INTERNAL MEDICINE

## 2024-01-03 PROCEDURE — 3075F SYST BP GE 130 - 139MM HG: CPT | Performed by: INTERNAL MEDICINE

## 2024-01-03 PROCEDURE — 93005 ELECTROCARDIOGRAM TRACING: CPT

## 2024-01-03 ASSESSMENT — ENCOUNTER SYMPTOMS
LIGHT-HEADEDNESS: 0
COUGH: 0
DYSURIA: 0
SORE THROAT: 0
ABDOMINAL PAIN: 0
MYALGIAS: 0
ACTIVITY CHANGE: 0

## 2024-01-03 ASSESSMENT — PATIENT HEALTH QUESTIONNAIRE - PHQ9
2. FEELING DOWN, DEPRESSED OR HOPELESS: NOT AT ALL
SUM OF ALL RESPONSES TO PHQ9 QUESTIONS 1 AND 2: 0
1. LITTLE INTEREST OR PLEASURE IN DOING THINGS: NOT AT ALL

## 2024-01-03 NOTE — PROGRESS NOTES
"CHIEF COMPLAINT:    Dayday Galarza \"Curtis\" is a 48 y.o. male who presents for Pre-op Exam (Patient in office today for POS for meniscus repair on 1/17/2024.).    HISTORY OF PRESENT ILLNESS:  Dayday Galarza  is a pleasant 48-year-old gentleman will go for left knee surgery with Dr. Pichardo. This is his third surgery regarding internal derangement of the left knee.  He continues to have pain, swelling.  MRI findings shows meniscal tear.  Patient otherwise had successful surgery in the past.  He did not have any complications other than continuing to have left knee pain.  However he recovered from the surgery very well.  He does not have any cardiac issues at this time.  He does not have pulmonary problem.  Patient will go for presurgical testing along with EKG.  I do not have his presurgical lab results but I do not anticipate anything abnormal in terms of his kidney function are H&H.        Review of Systems   Constitutional:  Negative for activity change.   HENT:  Negative for congestion and sore throat.    Respiratory:  Negative for cough.    Cardiovascular:  Negative for chest pain.   Gastrointestinal:  Negative for abdominal pain.   Endocrine: Negative for polyuria.   Genitourinary:  Negative for dysuria.   Musculoskeletal:  Negative for myalgias.   Skin:  Negative for rash.   Neurological:  Negative for light-headedness.   Psychiatric/Behavioral:  Negative for behavioral problems.      Visit Vitals  /86 (BP Location: Right arm, Patient Position: Sitting)   Pulse 95   Temp 36.8 °C (98.2 °F) (Temporal)   Ht 1.854 m (6' 1\")   Wt 98.1 kg (216 lb 4 oz)   SpO2 93%   BMI 28.53 kg/m²   Smoking Status Never   BSA 2.25 m²         Wt Readings from Last 10 Encounters:   01/03/24 98.1 kg (216 lb 4 oz)   09/12/23 105 kg (231 lb)   08/24/23 105 kg (232 lb)   06/19/23 104 kg (230 lb)   02/01/23 107 kg (236 lb)   11/23/22 105 kg (232 lb)   04/13/22 107 kg (235 lb 7.2 oz)   03/07/22 102 kg (225 lb 1.4 oz)   11/23/21 107 kg " (235 lb 14.3 oz)   11/11/21 106 kg (233 lb)       Physical Exam  Vitals and nursing note reviewed.   Constitutional:       Appearance: Normal appearance.   HENT:      Head: Normocephalic.      Right Ear: Tympanic membrane normal.      Left Ear: Tympanic membrane normal.      Nose: Nose normal.      Mouth/Throat:      Mouth: Mucous membranes are moist.   Cardiovascular:      Rate and Rhythm: Normal rate and regular rhythm.      Pulses: Normal pulses.      Heart sounds: No murmur heard.  Pulmonary:      Effort: No respiratory distress.      Breath sounds: Normal breath sounds.   Abdominal:      Palpations: Abdomen is soft.   Musculoskeletal:      Cervical back: Neck supple.      Right lower leg: No edema.      Left lower leg: No edema.   Skin:     General: Skin is warm.      Findings: No rash.   Neurological:      General: No focal deficit present.      Mental Status: He is alert and oriented to person, place, and time.   Psychiatric:         Mood and Affect: Mood normal.        RECENT LABS:  Lab Results   Component Value Date    WBC 5.1 08/25/2023    HGB 14.7 08/25/2023    HCT 44.9 08/25/2023     08/25/2023    CHOL 183 08/25/2023    TRIG 224 (H) 08/25/2023    HDL 39.2 (A) 08/25/2023    ALT 21 08/25/2023    AST 16 08/25/2023     08/25/2023    K 4.3 08/25/2023     08/25/2023    CREATININE 1.04 08/25/2023    BUN 19 08/25/2023    CO2 26 08/25/2023    TSH 1.73 08/25/2023    INR 1.1 06/14/2022    HGBA1C 5.2 08/25/2023     IMAGING:  Reviewed:   MEDICATIONS:   Current Outpatient Medications   Medication Instructions    cholecalciferol (Vitamin D-3) 50,000 unit capsule Take 1 capsule weekly for 12 weeks    erenumab (Aimovig Autoinjector) 70 mg/mL injection     hydrALAZINE (APRESOLINE) 100 mg, oral, 2 times daily    pantoprazole (PROTONIX) 40 mg, oral, 2 times daily      TODAY'S VISIT  DX:   1. Other tear of lateral meniscus, current injury, left knee, initial encounter        2. Chondromalacia, left knee         3. Internal derangement of left knee           MEDICAL DECISION MAKING:  - Recent lab work and relevant imaging studies have been reviewed.    - The current active medical co morbidities have been considered.   - Relevant correspondence/notes from specialty consultants were reviewed and discussed with patient.    - Patient was given treatment as per above plan.   -Patient is at acceptable risk to go for arthroscopic repair of the left knee.  - Patient will continue with current medical therapy and plan.   - Medication have been sent for refill.    - Next Follow up 3 months..

## 2024-01-05 LAB
ATRIAL RATE: 76 BPM
P AXIS: 63 DEGREES
P OFFSET: 187 MS
P ONSET: 127 MS
PR INTERVAL: 192 MS
Q ONSET: 223 MS
QRS COUNT: 13 BEATS
QRS DURATION: 92 MS
QT INTERVAL: 380 MS
QTC CALCULATION(BAZETT): 427 MS
QTC FREDERICIA: 411 MS
R AXIS: 50 DEGREES
T AXIS: 60 DEGREES
T OFFSET: 413 MS
VENTRICULAR RATE: 76 BPM

## 2024-01-09 ENCOUNTER — TELEPHONE (OUTPATIENT)
Dept: ORTHOPEDIC SURGERY | Facility: CLINIC | Age: 49
End: 2024-01-09
Payer: COMMERCIAL

## 2024-01-09 NOTE — TELEPHONE ENCOUNTER
01/09/24  Spoke w/ pt re availability of crutches 1for post-op use following sx next week.  He does have some and will take to the hopital so they are available when he is discharged.

## 2024-01-15 ENCOUNTER — LAB (OUTPATIENT)
Dept: LAB | Facility: LAB | Age: 49
End: 2024-01-15
Payer: COMMERCIAL

## 2024-01-15 DIAGNOSIS — D64.9 ANEMIA, UNSPECIFIED TYPE: ICD-10-CM

## 2024-01-15 DIAGNOSIS — S83.282A OTHER TEAR OF LATERAL MENISCUS, CURRENT INJURY, LEFT KNEE, INITIAL ENCOUNTER: ICD-10-CM

## 2024-01-15 DIAGNOSIS — M94.262 CHONDROMALACIA, LEFT KNEE: ICD-10-CM

## 2024-01-15 LAB
ALBUMIN SERPL BCP-MCNC: 4.3 G/DL (ref 3.4–5)
ALP SERPL-CCNC: 86 U/L (ref 33–120)
ALT SERPL W P-5'-P-CCNC: 18 U/L (ref 10–52)
ANION GAP SERPL CALC-SCNC: 9 MMOL/L (ref 10–20)
AST SERPL W P-5'-P-CCNC: 14 U/L (ref 9–39)
BASOPHILS # BLD AUTO: 0.03 X10*3/UL (ref 0–0.1)
BASOPHILS NFR BLD AUTO: 0.5 %
BILIRUB SERPL-MCNC: 0.4 MG/DL (ref 0–1.2)
BUN SERPL-MCNC: 24 MG/DL (ref 6–23)
CALCIUM SERPL-MCNC: 9.1 MG/DL (ref 8.6–10.3)
CHLORIDE SERPL-SCNC: 106 MMOL/L (ref 98–107)
CO2 SERPL-SCNC: 28 MMOL/L (ref 21–32)
CREAT SERPL-MCNC: 1.06 MG/DL (ref 0.5–1.3)
EGFRCR SERPLBLD CKD-EPI 2021: 88 ML/MIN/1.73M*2
EOSINOPHIL # BLD AUTO: 0.2 X10*3/UL (ref 0–0.7)
EOSINOPHIL NFR BLD AUTO: 3.5 %
ERYTHROCYTE [DISTWIDTH] IN BLOOD BY AUTOMATED COUNT: 13.2 % (ref 11.5–14.5)
GLUCOSE SERPL-MCNC: 125 MG/DL (ref 74–99)
HCT VFR BLD AUTO: 45.5 % (ref 41–52)
HGB BLD-MCNC: 14.6 G/DL (ref 13.5–17.5)
IMM GRANULOCYTES # BLD AUTO: 0.04 X10*3/UL (ref 0–0.7)
IMM GRANULOCYTES NFR BLD AUTO: 0.7 % (ref 0–0.9)
INR PPP: 1.1 (ref 0.9–1.1)
LYMPHOCYTES # BLD AUTO: 1.63 X10*3/UL (ref 1.2–4.8)
LYMPHOCYTES NFR BLD AUTO: 28.3 %
MCH RBC QN AUTO: 31.4 PG (ref 26–34)
MCHC RBC AUTO-ENTMCNC: 32.1 G/DL (ref 32–36)
MCV RBC AUTO: 98 FL (ref 80–100)
MONOCYTES # BLD AUTO: 0.79 X10*3/UL (ref 0.1–1)
MONOCYTES NFR BLD AUTO: 13.7 %
NEUTROPHILS # BLD AUTO: 3.06 X10*3/UL (ref 1.2–7.7)
NEUTROPHILS NFR BLD AUTO: 53.3 %
NRBC BLD-RTO: 0 /100 WBCS (ref 0–0)
PLATELET # BLD AUTO: 230 X10*3/UL (ref 150–450)
POTASSIUM SERPL-SCNC: 4.1 MMOL/L (ref 3.5–5.3)
PROT SERPL-MCNC: 6.8 G/DL (ref 6.4–8.2)
PROTHROMBIN TIME: 11.9 SECONDS (ref 9.8–12.8)
RBC # BLD AUTO: 4.65 X10*6/UL (ref 4.5–5.9)
SODIUM SERPL-SCNC: 139 MMOL/L (ref 136–145)
WBC # BLD AUTO: 5.8 X10*3/UL (ref 4.4–11.3)

## 2024-01-15 PROCEDURE — 85610 PROTHROMBIN TIME: CPT

## 2024-01-15 PROCEDURE — 85025 COMPLETE CBC W/AUTO DIFF WBC: CPT

## 2024-01-15 PROCEDURE — 80053 COMPREHEN METABOLIC PANEL: CPT

## 2024-01-15 PROCEDURE — 36415 COLL VENOUS BLD VENIPUNCTURE: CPT

## 2024-01-15 RX ORDER — ALBUTEROL SULFATE 0.63 MG/3ML
0.63 SOLUTION RESPIRATORY (INHALATION) EVERY 6 HOURS PRN
COMMUNITY

## 2024-01-17 ENCOUNTER — HOSPITAL ENCOUNTER (OUTPATIENT)
Facility: HOSPITAL | Age: 49
Setting detail: OUTPATIENT SURGERY
Discharge: HOME | End: 2024-01-17
Attending: ORTHOPAEDIC SURGERY | Admitting: ORTHOPAEDIC SURGERY
Payer: COMMERCIAL

## 2024-01-17 ENCOUNTER — ANESTHESIA (OUTPATIENT)
Dept: OPERATING ROOM | Facility: HOSPITAL | Age: 49
End: 2024-01-17
Payer: COMMERCIAL

## 2024-01-17 ENCOUNTER — ANESTHESIA EVENT (OUTPATIENT)
Dept: OPERATING ROOM | Facility: HOSPITAL | Age: 49
End: 2024-01-17
Payer: COMMERCIAL

## 2024-01-17 VITALS
BODY MASS INDEX: 27.96 KG/M2 | SYSTOLIC BLOOD PRESSURE: 120 MMHG | DIASTOLIC BLOOD PRESSURE: 87 MMHG | WEIGHT: 210.98 LBS | HEIGHT: 73 IN | RESPIRATION RATE: 16 BRPM | OXYGEN SATURATION: 100 % | TEMPERATURE: 97.9 F | HEART RATE: 71 BPM

## 2024-01-17 DIAGNOSIS — S83.282A OTHER TEAR OF LATERAL MENISCUS, CURRENT INJURY, LEFT KNEE, INITIAL ENCOUNTER: Primary | ICD-10-CM

## 2024-01-17 DIAGNOSIS — M94.262 CHONDROMALACIA, LEFT KNEE: ICD-10-CM

## 2024-01-17 PROBLEM — Z98.890 PONV (POSTOPERATIVE NAUSEA AND VOMITING): Status: ACTIVE | Noted: 2024-01-17

## 2024-01-17 PROBLEM — R11.2 PONV (POSTOPERATIVE NAUSEA AND VOMITING): Status: ACTIVE | Noted: 2024-01-17

## 2024-01-17 PROCEDURE — 7100000010 HC PHASE TWO TIME - EACH INCREMENTAL 1 MINUTE: Performed by: ORTHOPAEDIC SURGERY

## 2024-01-17 PROCEDURE — 2500000004 HC RX 250 GENERAL PHARMACY W/ HCPCS (ALT 636 FOR OP/ED): Performed by: ANESTHESIOLOGY

## 2024-01-17 PROCEDURE — 3600000009 HC OR TIME - EACH INCREMENTAL 1 MINUTE - PROCEDURE LEVEL FOUR: Performed by: ORTHOPAEDIC SURGERY

## 2024-01-17 PROCEDURE — 2500000005 HC RX 250 GENERAL PHARMACY W/O HCPCS: Performed by: ANESTHESIOLOGY

## 2024-01-17 PROCEDURE — 2500000004 HC RX 250 GENERAL PHARMACY W/ HCPCS (ALT 636 FOR OP/ED): Performed by: ORTHOPAEDIC SURGERY

## 2024-01-17 PROCEDURE — 3700000002 HC GENERAL ANESTHESIA TIME - EACH INCREMENTAL 1 MINUTE: Performed by: ORTHOPAEDIC SURGERY

## 2024-01-17 PROCEDURE — 29880 ARTHRS KNE SRG MNISECTMY M&L: CPT | Performed by: ORTHOPAEDIC SURGERY

## 2024-01-17 PROCEDURE — 3700000001 HC GENERAL ANESTHESIA TIME - INITIAL BASE CHARGE: Performed by: ORTHOPAEDIC SURGERY

## 2024-01-17 PROCEDURE — 2720000007 HC OR 272 NO HCPCS: Performed by: ORTHOPAEDIC SURGERY

## 2024-01-17 PROCEDURE — 7100000002 HC RECOVERY ROOM TIME - EACH INCREMENTAL 1 MINUTE: Performed by: ORTHOPAEDIC SURGERY

## 2024-01-17 PROCEDURE — 7100000001 HC RECOVERY ROOM TIME - INITIAL BASE CHARGE: Performed by: ORTHOPAEDIC SURGERY

## 2024-01-17 PROCEDURE — A4217 STERILE WATER/SALINE, 500 ML: HCPCS | Performed by: ORTHOPAEDIC SURGERY

## 2024-01-17 PROCEDURE — 7100000009 HC PHASE TWO TIME - INITIAL BASE CHARGE: Performed by: ORTHOPAEDIC SURGERY

## 2024-01-17 PROCEDURE — 2500000004 HC RX 250 GENERAL PHARMACY W/ HCPCS (ALT 636 FOR OP/ED): Performed by: PHYSICIAN ASSISTANT

## 2024-01-17 PROCEDURE — 3600000004 HC OR TIME - INITIAL BASE CHARGE - PROCEDURE LEVEL FOUR: Performed by: ORTHOPAEDIC SURGERY

## 2024-01-17 PROCEDURE — 2500000005 HC RX 250 GENERAL PHARMACY W/O HCPCS: Performed by: ORTHOPAEDIC SURGERY

## 2024-01-17 RX ORDER — DIPHENHYDRAMINE HYDROCHLORIDE 50 MG/ML
INJECTION INTRAMUSCULAR; INTRAVENOUS AS NEEDED
Status: DISCONTINUED | OUTPATIENT
Start: 2024-01-17 | End: 2024-01-17

## 2024-01-17 RX ORDER — OXYCODONE HYDROCHLORIDE 5 MG/1
5 TABLET ORAL EVERY 4 HOURS PRN
Status: CANCELLED | OUTPATIENT
Start: 2024-01-17

## 2024-01-17 RX ORDER — ONDANSETRON HYDROCHLORIDE 2 MG/ML
INJECTION, SOLUTION INTRAVENOUS AS NEEDED
Status: DISCONTINUED | OUTPATIENT
Start: 2024-01-17 | End: 2024-01-17

## 2024-01-17 RX ORDER — LIDOCAINE HYDROCHLORIDE 20 MG/ML
INJECTION, SOLUTION INFILTRATION; PERINEURAL AS NEEDED
Status: DISCONTINUED | OUTPATIENT
Start: 2024-01-17 | End: 2024-01-17

## 2024-01-17 RX ORDER — SODIUM CHLORIDE 9 MG/ML
50 INJECTION, SOLUTION INTRAVENOUS CONTINUOUS
Status: DISCONTINUED | OUTPATIENT
Start: 2024-01-17 | End: 2024-01-17 | Stop reason: HOSPADM

## 2024-01-17 RX ORDER — PROPOFOL 10 MG/ML
INJECTION, EMULSION INTRAVENOUS AS NEEDED
Status: DISCONTINUED | OUTPATIENT
Start: 2024-01-17 | End: 2024-01-17

## 2024-01-17 RX ORDER — LABETALOL HYDROCHLORIDE 5 MG/ML
5 INJECTION, SOLUTION INTRAVENOUS ONCE AS NEEDED
Status: CANCELLED | OUTPATIENT
Start: 2024-01-17

## 2024-01-17 RX ORDER — FENTANYL CITRATE 50 UG/ML
25 INJECTION, SOLUTION INTRAMUSCULAR; INTRAVENOUS EVERY 5 MIN PRN
Status: DISCONTINUED | OUTPATIENT
Start: 2024-01-17 | End: 2024-01-17 | Stop reason: HOSPADM

## 2024-01-17 RX ORDER — DROPERIDOL 2.5 MG/ML
0.62 INJECTION, SOLUTION INTRAMUSCULAR; INTRAVENOUS ONCE AS NEEDED
Status: CANCELLED | OUTPATIENT
Start: 2024-01-17

## 2024-01-17 RX ORDER — DEXAMETHASONE SODIUM PHOSPHATE 4 MG/ML
INJECTION, SOLUTION INTRA-ARTICULAR; INTRALESIONAL; INTRAMUSCULAR; INTRAVENOUS; SOFT TISSUE AS NEEDED
Status: DISCONTINUED | OUTPATIENT
Start: 2024-01-17 | End: 2024-01-17

## 2024-01-17 RX ORDER — ALBUTEROL SULFATE 0.83 MG/ML
2.5 SOLUTION RESPIRATORY (INHALATION) ONCE
Status: CANCELLED | OUTPATIENT
Start: 2024-01-17 | End: 2024-01-17

## 2024-01-17 RX ORDER — SODIUM CHLORIDE 0.9 G/100ML
IRRIGANT IRRIGATION AS NEEDED
Status: DISCONTINUED | OUTPATIENT
Start: 2024-01-17 | End: 2024-01-17 | Stop reason: HOSPADM

## 2024-01-17 RX ORDER — HYDROCODONE BITARTRATE AND ACETAMINOPHEN 5; 325 MG/1; MG/1
1 TABLET ORAL EVERY 6 HOURS PRN
Qty: 20 TABLET | Refills: 0 | Status: SHIPPED | OUTPATIENT
Start: 2024-01-17 | End: 2024-03-14 | Stop reason: WASHOUT

## 2024-01-17 RX ORDER — ACETAMINOPHEN 325 MG/1
TABLET ORAL AS NEEDED
Status: DISCONTINUED | OUTPATIENT
Start: 2024-01-17 | End: 2024-01-17

## 2024-01-17 RX ORDER — SODIUM CHLORIDE, SODIUM LACTATE, POTASSIUM CHLORIDE, CALCIUM CHLORIDE 600; 310; 30; 20 MG/100ML; MG/100ML; MG/100ML; MG/100ML
100 INJECTION, SOLUTION INTRAVENOUS CONTINUOUS
Status: CANCELLED | OUTPATIENT
Start: 2024-01-17

## 2024-01-17 RX ORDER — MIDAZOLAM HYDROCHLORIDE 1 MG/ML
INJECTION, SOLUTION INTRAMUSCULAR; INTRAVENOUS AS NEEDED
Status: DISCONTINUED | OUTPATIENT
Start: 2024-01-17 | End: 2024-01-17

## 2024-01-17 RX ORDER — KETOROLAC TROMETHAMINE 30 MG/ML
INJECTION, SOLUTION INTRAMUSCULAR; INTRAVENOUS AS NEEDED
Status: DISCONTINUED | OUTPATIENT
Start: 2024-01-17 | End: 2024-01-17

## 2024-01-17 RX ORDER — FENTANYL CITRATE 50 UG/ML
50 INJECTION, SOLUTION INTRAMUSCULAR; INTRAVENOUS EVERY 5 MIN PRN
Status: DISCONTINUED | OUTPATIENT
Start: 2024-01-17 | End: 2024-01-17 | Stop reason: HOSPADM

## 2024-01-17 RX ORDER — CEFAZOLIN SODIUM 2 G/100ML
2 INJECTION, SOLUTION INTRAVENOUS ONCE
Status: COMPLETED | OUTPATIENT
Start: 2024-01-17 | End: 2024-01-17

## 2024-01-17 RX ORDER — MEPERIDINE HYDROCHLORIDE 25 MG/ML
12.5 INJECTION INTRAMUSCULAR; INTRAVENOUS; SUBCUTANEOUS EVERY 10 MIN PRN
Status: DISCONTINUED | OUTPATIENT
Start: 2024-01-17 | End: 2024-01-17 | Stop reason: HOSPADM

## 2024-01-17 RX ORDER — OXYCODONE HYDROCHLORIDE 5 MG/1
10 TABLET ORAL EVERY 4 HOURS PRN
Status: CANCELLED | OUTPATIENT
Start: 2024-01-17

## 2024-01-17 RX ORDER — LIDOCAINE HYDROCHLORIDE 10 MG/ML
0.1 INJECTION, SOLUTION EPIDURAL; INFILTRATION; INTRACAUDAL; PERINEURAL ONCE
Status: CANCELLED | OUTPATIENT
Start: 2024-01-17 | End: 2024-01-17

## 2024-01-17 RX ORDER — MEPERIDINE HYDROCHLORIDE 25 MG/ML
12.5 INJECTION INTRAMUSCULAR; INTRAVENOUS; SUBCUTANEOUS EVERY 10 MIN PRN
Status: CANCELLED | OUTPATIENT
Start: 2024-01-17

## 2024-01-17 RX ORDER — MIDAZOLAM HYDROCHLORIDE 1 MG/ML
1 INJECTION, SOLUTION INTRAMUSCULAR; INTRAVENOUS ONCE AS NEEDED
Status: CANCELLED | OUTPATIENT
Start: 2024-01-17

## 2024-01-17 RX ORDER — SCOLOPAMINE TRANSDERMAL SYSTEM 1 MG/1
1 PATCH, EXTENDED RELEASE TRANSDERMAL
Status: DISCONTINUED | OUTPATIENT
Start: 2024-01-17 | End: 2024-01-17 | Stop reason: HOSPADM

## 2024-01-17 RX ORDER — LIDOCAINE HYDROCHLORIDE 10 MG/ML
INJECTION, SOLUTION EPIDURAL; INFILTRATION; INTRACAUDAL; PERINEURAL AS NEEDED
Status: DISCONTINUED | OUTPATIENT
Start: 2024-01-17 | End: 2024-01-17 | Stop reason: HOSPADM

## 2024-01-17 RX ORDER — SODIUM CHLORIDE AND POTASSIUM CHLORIDE 150; 900 MG/100ML; MG/100ML
100 INJECTION, SOLUTION INTRAVENOUS CONTINUOUS
Status: DISCONTINUED | OUTPATIENT
Start: 2024-01-17 | End: 2024-01-17

## 2024-01-17 RX ADMIN — DEXAMETHASONE SODIUM PHOSPHATE 4 MG: 4 INJECTION, SOLUTION INTRAMUSCULAR; INTRAVENOUS at 09:02

## 2024-01-17 RX ADMIN — CEFAZOLIN SODIUM 2 G: 2 INJECTION, SOLUTION INTRAVENOUS at 08:58

## 2024-01-17 RX ADMIN — SODIUM CHLORIDE 50 ML/HR: 9 INJECTION, SOLUTION INTRAVENOUS at 06:51

## 2024-01-17 RX ADMIN — SCOPALAMINE 1 PATCH: 1 PATCH, EXTENDED RELEASE TRANSDERMAL at 07:30

## 2024-01-17 RX ADMIN — KETOROLAC TROMETHAMINE 30 MG: 30 INJECTION, SOLUTION INTRAMUSCULAR at 09:42

## 2024-01-17 RX ADMIN — DIPHENHYDRAMINE HYDROCHLORIDE 12.5 MG: 50 INJECTION INTRAMUSCULAR; INTRAVENOUS at 09:02

## 2024-01-17 RX ADMIN — MIDAZOLAM 2 MG: 1 INJECTION INTRAMUSCULAR; INTRAVENOUS at 08:47

## 2024-01-17 RX ADMIN — LIDOCAINE HYDROCHLORIDE 100 MG: 20 INJECTION, SOLUTION INFILTRATION; PERINEURAL at 08:53

## 2024-01-17 RX ADMIN — ONDANSETRON 4 MG: 2 INJECTION, SOLUTION INTRAMUSCULAR; INTRAVENOUS at 09:42

## 2024-01-17 RX ADMIN — ACETAMINOPHEN 975 MG: 325 TABLET ORAL at 07:53

## 2024-01-17 RX ADMIN — PROPOFOL 400 MG: 10 INJECTION, EMULSION INTRAVENOUS at 08:53

## 2024-01-17 ASSESSMENT — COLUMBIA-SUICIDE SEVERITY RATING SCALE - C-SSRS
6. HAVE YOU EVER DONE ANYTHING, STARTED TO DO ANYTHING, OR PREPARED TO DO ANYTHING TO END YOUR LIFE?: NO
1. IN THE PAST MONTH, HAVE YOU WISHED YOU WERE DEAD OR WISHED YOU COULD GO TO SLEEP AND NOT WAKE UP?: NO
2. HAVE YOU ACTUALLY HAD ANY THOUGHTS OF KILLING YOURSELF?: NO

## 2024-01-17 ASSESSMENT — PAIN - FUNCTIONAL ASSESSMENT
PAIN_FUNCTIONAL_ASSESSMENT: 0-10

## 2024-01-17 ASSESSMENT — PAIN SCALES - GENERAL
PAINLEVEL_OUTOF10: 0 - NO PAIN
PAINLEVEL_OUTOF10: 0 - NO PAIN
PAINLEVEL_OUTOF10: 7
PAINLEVEL_OUTOF10: 1
PAINLEVEL_OUTOF10: 1
PAINLEVEL_OUTOF10: 0 - NO PAIN
PAINLEVEL_OUTOF10: 0 - NO PAIN

## 2024-01-17 ASSESSMENT — PAIN DESCRIPTION - DESCRIPTORS: DESCRIPTORS: SHARP

## 2024-01-17 NOTE — ANESTHESIA PROCEDURE NOTES
Airway  Date/Time: 1/17/2024 8:56 AM  Urgency: elective    Airway not difficult    Staffing  Performed: attending   Authorized by: Marlon Sims MD    Performed by: Marlon Sims MD  Patient location during procedure: OR    Indications and Patient Condition  Indications for airway management: anesthesia  Spontaneous ventilation: present  Sedation level: minimal  Patient position: sniffing  MILS not maintained throughout  Mask difficulty assessment: 0 - not attempted  Planned trial extubation    Final Airway Details  Final airway type: supraglottic airway      Successful airway: Supraglottic airway: ambu.  Size 4     Number of attempts at approach: 1  Ventilation between attempts: none  Number of other approaches attempted: 0

## 2024-01-17 NOTE — ANESTHESIA PREPROCEDURE EVALUATION
Dayday Galarza is a 48 y.o. male here for:    [unfilled]  Meniscectomy Arthroscopy Knee LATERAL  With Vaibhav Pichardo MD  Pre-Op Diagnosis Codes:     * Other tear of lateral meniscus, current injury, left knee, initial encounter [S83.282A]     * Chondromalacia, left knee [M94.262]    Lab Results   Component Value Date    HGB 14.6 01/15/2024    HCT 45.5 01/15/2024    WBC 5.8 01/15/2024     01/15/2024     01/15/2024    K 4.1 01/15/2024     01/15/2024    CREATININE 1.06 01/15/2024    BUN 24 (H) 01/15/2024       Social History     Substance and Sexual Activity   Drug Use Never      Tobacco Use: Low Risk  (1/17/2024)    Patient History    • Smoking Tobacco Use: Never    • Smokeless Tobacco Use: Never    • Passive Exposure: Not on file      Social History     Substance and Sexual Activity   Alcohol Use Never        Allergies   Allergen Reactions   • Sumatriptan Other     Chest pain with SOB   • Tizanidine Confusion and Hives   • Flexeril [Cyclobenzaprine] Rash       Current Outpatient Medications   Medication Instructions   • albuterol 0.63 mg, nebulization, Every 6 hours PRN   • cholecalciferol (Vitamin D-3) 50,000 unit capsule Take 1 capsule weekly for 12 weeks   • erenumab (Aimovig Autoinjector) 70 mg/mL injection    • hydrALAZINE (APRESOLINE) 100 mg, oral, 2 times daily   • HYDROcodone-acetaminophen (Norco) 5-325 mg tablet 1 tablet, oral, Every 6 hours PRN   • pantoprazole (PROTONIX) 40 mg, oral, As needed       Past Medical History:   Diagnosis Date   • Asthma    • COVID-19     NOT VACCINATED   • GERD (gastroesophageal reflux disease)    • Hypertension    • Left elbow pain        Past Surgical History:   Procedure Laterality Date   • OTHER SURGICAL HISTORY  12/04/2021    Colonoscopy   • OTHER SURGICAL HISTORY  12/04/2021    Neck surgery   • OTHER SURGICAL HISTORY Left 08/09/2022    Anterior cruciate ligament repair   • OTHER SURGICAL HISTORY Left 08/09/2022    Meniscus repair       Family  "History   Problem Relation Name Age of Onset   • Hypertension Father     • Other (bladder cancer) Maternal Grandmother     • Heart attack Paternal Grandfather         Relevant Problems   Anesthesia   (+) PONV (postoperative nausea and vomiting)      Cardiovascular   (+) Hypertension      Endocrine (within normal limits)      GI   (+) Gastroesophageal reflux disease with esophagitis without hemorrhage      /Renal   (+) Fatty liver   (+) Renal cyst      Neuro/Psych (within normal limits)      Pulmonary (within normal limits)      GI/Hepatic   (+) Fatty liver      Hematology (within normal limits)      Musculoskeletal   (+) Chondromalacia, left knee      Eyes, Ears, Nose, and Throat (within normal limits)      Infectious Disease (within normal limits)       Visit Vitals  /85 Comment: right arm disposable adult cuff   Pulse 75   Temp 36.5 °C (97.7 °F) (Temporal)   Resp 16   Ht 1.854 m (6' 1\")   Wt 95.7 kg (210 lb 15.7 oz)   SpO2 96%   BMI 27.84 kg/m²   Smoking Status Never   BSA 2.22 m²       NPO Details:  NPO/Void Status  Carbohydrate Drink Given Prior to Surgery? : N  Date of Last Liquid: 01/16/24  Time of Last Liquid: 2000  Date of Last Solid: 01/16/24  Time of Last Solid: 2000  Last Intake Type: Clear fluids  Time of Last Void: 0445        Physical Exam    Airway  Mallampati: II  TM distance: >3 FB     Cardiovascular - normal exam     Dental - normal exam     Pulmonary - normal exam     Abdominal - normal exam  Abdomen: soft           Anesthesia Plan    History of general anesthesia?: yes  History of complications of general anesthesia?: yes    ASA 2     general     intravenous induction   Anesthetic plan and risks discussed with patient.    "

## 2024-01-17 NOTE — INTERVAL H&P NOTE
Interval History and Physical     I have interviewed and examined the patient and reviewed the recent History and Physical.  There have been no changes to the recent H&P documentation.     The patient understands the planned operation and its associated risks and benefits and agrees to proceed.     The surgical consent form has been signed.    There were no vitals taken for this visit.     Vaibhav Pichardo MD

## 2024-01-17 NOTE — OP NOTE
"Meniscectomy Arthroscopy Knee PARTIAL MEDIAL & LATERAL (L) Operative Note     Date: 2024  OR Location: [facility]    Name: Dayday Galarza \"Curtis\", : 1975, Age: 48 y.o., MRN: [id number], Sex: male    Diagnosis  Pre-op Diagnosis     * Other tear of lateral meniscus, current injury, left knee, initial encounter [S83.282A]     * Chondromalacia, left knee [M94.262] Post-op Diagnosis     * Other tear of lateral meniscus, current injury, left knee, initial encounter [S83.282A]     * Chondromalacia, left knee [M94.262]     * Acute medial meniscus tear, left, subsequent encounter [S83.242D]       Procedures  Meniscectomy Arthroscopy Knee PARTIAL MEDIAL & LATERAL  [id number] - [place] ARTHRS KNE SURG W/MENISCECTOMY MED&LAT W/SHVG      Surgeons      * Vaibhav Pichardo - Primary    Resident/Fellow/Other Assistant:  Surgeon(s) and Role:     * Sukhdev Garibay PA-C - Assisting    Procedure Summary  Anesthesia: General  ASA: II  Anesthesia Staff: Anesthesiologist: Marlon Sims MD  Estimated Blood Loss: Minimal  Intra-op Medications: * No intraprocedure medications in log *           Anesthesia Record               Intraprocedure I/O Totals          Intake    ceFAZolin in dextrose (iso-os) (Ancef) IVPB 2 g 100.00 mL    Total Intake 100 mL          Specimen: No specimens collected     Staff:   Circulator: Quinten Blanco RN  Scrub Person: Kathie Dobson         Drains and/or Catheters: * None in log *    Tourniquet Times:   * Missing tourniquet times found for documented tourniquets in lo *     Implants:     Findings: Horizontal tear posterior horn medial meniscus, fishmouth tear lateral meniscus, grade III chondromalacia knee    Indications: Curtis Galarza is an 48 y.o. male who is having surgery for Other tear of lateral meniscus, current injury, left knee, initial encounter [S83.282A]  Chondromalacia, left knee [M94.262].     The patient was seen in the preoperative area. The risks, benefits, complications, treatment " options, non-operative alternatives, expected recovery and outcomes were discussed with the patient. The possibilities of reaction to medication, pulmonary aspiration, injury to surrounding structures, bleeding, recurrent infection, the need for additional procedures, failure to diagnose a condition, and creating a complication requiring transfusion or operation were discussed with the patient. The patient concurred with the proposed plan, giving informed consent.  The site of surgery was properly noted/marked if necessary per policy. The patient has been actively warmed in preoperative area. Preoperative antibiotics have been ordered and given within 1 hours of incision. Venous thrombosis prophylaxis are not indicated.    Procedure Details: Operative findings: (Outerbridge classification 0-4)   Patella: 3 focal  Trochlea: 2-3  Medial compartment: 3  Lateral compartment: 3     Operative Indications:  Patient was noted to have internal derangement of the knee refractory to non-surgical modalities.  We discussed associated interventions and the risks of the surgery, including DVT/PE, infection, stiffness, medical complications, and incomplete relief of pre-operative symptoms.     Implants:  None     Operative Procedure:  The patient was met prior to surgery and the appropriate extremity was marked.  We reviewed recent health history and found no contraindication to proceeding with the planned procedure.  The patient was transported to the operating room and underwent general anesthesia.  The patient was positioned supine on the operative table with all jay prominences well-padded.  A non-sterile thigh tourniquet was placed and a padded lateral thigh post.     The leg was prepped and draped in the usual sterile fashion.      A timeout was completed, the patient identified, the site and laterality confirmed long with antibiotic administration was in accordance with standard protocol.     The superficial landmarks of  the knee were palpated and anteromedial and anterolateral portals were created.     A diagnostic arthroscopy was performed utilizing a fluid pump with sterile saline.  The articular surface of the patella, trochlea, medial and lateral femoral condyles and tibial plateaus were evaluated.  The Outerbridge classifications are listed above.  The gutters were evaluated and no loose bodies were noted. The medial compartment was entered with valgus stress in a slightly flexed knee against the lateral post.  The assistant helped with this to facilitate visualization.  The meniscus was probed superiorly and inferiorly using a blunt probe.  There was a small horizontal tear of the posterior horn.  The notch was inspected and the ACL and PCL were healthy in appearance and had appropriate tension when probed.  The knee was then flexed into a figure of four position and the lateral compartment was entered.  There was a fishmouth tear of the body of the lateral meniscus that extended anterior.  There was also a small tear, a radial tear near the root.     The articular surfaces was noted to have some chondral wear but there were no delaminated or loose fragments that appeared amenable to a chondroplasty.       I then turned my attention to the medial meniscus.  Using an aggressive plus shaver and meniscal biters long with an arthroscopic radiofrequency probe, we debrided the meniscus back until we obtained healthy and stable margins.  There was a loose chondral fragment which I removed with a grasper.  It measured than 5 mm.    I then turned my attention to the lateral meniscus.  I introduced a basket forceps as well as a shaver and a radiofrequency probe to debride the lateral meniscus back to a stable edge.  I switched portals and completed the debridement.  I then passed a probe.  I palpated the lateral meniscus.  I was satisfied that there was no residual tear.  Similarly I palpated the medial meniscus and was satisfied that  I had debrided this back to a stable edge.     The knee was irrigated and lavaged to remove and loose meniscal or chondral debris.  A second pass was made diagnostically to confirm removal of any fragments and inspect for any additional pathology.  The residual fluid was expressed from the knee.  The portals were closed using a nylon suture.  Local anesthetic was injected into the soft tissue around the portals. Sterile bandages were placed.  The patient was stable to the recovery room.     I was present and participated in the entire procedure. The Physician Assistant participated in all critical elements of the procedure under my direct supervision. The surgical incision was closed by the PA under my indirect supervision. There were no qualified residents available to assist.     The physician assistant was present for the entire case.  Given the nature of the procedure and disease process a skilled surgical assistant was necessary for the case.  The assistant was necessary for retraction and helped directly facilitate completion of the surgery.  A certified scrub tech was at the back table managing instruments and supplies for the surgical procedure.       Complications:  None; patient tolerated the procedure well.    Disposition: PACU - hemodynamically stable.  Condition: stable         Additional Details: Not applicable    Attending Attestation: I performed the procedure.    Vaibhav Pichardo  Phone Number: [phone]

## 2024-01-17 NOTE — DISCHARGE INSTRUCTIONS
General Anesthesia Discharge Instructions    About this topic  You may need general anesthesia if you need to be asleep during a procedure. Your doctor will use drugs to block the signals that go from your nerves to your brain. Doctors give general anesthesia during a surgery or procedure to:  Allow you to sleep  Help your body be still  Relax your muscles  Help you to relax and be pain free  Keep you from remembering the surgery  Let the doctor manage your airway, breathing, and blood flow  The doctor or nurse anesthetist gives general anesthesia by a shot into your vein. Sometimes, you may breathe in a gas through a mask placed over your face.  What care is needed at home?  Ask your doctor what you need to do when you go home. Make sure you ask questions if you do not understand what the doctor says.  Your doctor may give you drugs to prevent or treat an upset stomach from the anesthetic. Take them as ordered.  If your throat is sore, suck on ice chips or popsicles to ease throat pain.  Put 2 to 3 pillows under your head and back when you lie down to help you breathe easier.  For the first 24 to 48 hours:  Do not operate heavy or dangerous machinery.  Do not make major decisions or sign important papers. You may not be able to think clearly.  Avoid beer, wine, or mixed drinks.  You are at a higher risk of falling for at least 24 hours after general anesthesia.  Take extra care when you get up.  Do not change positions quickly.  Do not rush when you need to go to the bathroom or to answer the phone.  Ask for help if you feel unsteady when you try to walk.  Wear shoes with non-slip soles and low heels.  What follow-up care is needed?  Your doctor may ask you to come back to the office to check on your progress. Be sure to keep these visits.  If you have stitches that do not dissolve or staples, you will need to have them removed. Your doctor will want to do this in 1 to 2 weeks. If the doctor used skin glue, the  glue will fall off on its own.  What drugs may be needed?  The doctor may order drugs to:  Help with pain  Treat an upset stomach or throwing up  Will physical activity be limited?  You will not be allowed to drive right away after the procedure. Ask a family member or a friend to drive you home.  Avoid trying to get out of bed without help until you are sure of your balance.  You may have to limit your activity. Talk to your doctor about if you need to limit how much you lift or limit exercise after your procedure.  What changes to diet are needed?  Start with a light diet when you are fully awake. This includes things that are easy to swallow like soups, pudding, jello, toast, and eggs. Slowly progress to your normal diet.  What problems could happen?  Low blood pressure  Breathing problems  Upset stomach or throwing up  Dizziness  Blood clots  Infection  When do I need to call the doctor?  Trouble breathing  Upset stomach or throwing up more than 3 times in the next 2 days  Dizziness  Teach Back: Helping You Understand  The Teach Back Method helps you understand the information we are giving you. After you talk with the staff, tell them in your own words what you learned. This helps to make sure the staff has described each thing clearly. It also helps to explain things that may have been confusing. Before going home, make sure you can do these:  I can tell you about my procedure.  I can tell you if I need to follow up with my doctor.  I can tell you what is good for me to eat and drink the next day.  I can tell you what I would do if I have trouble breathing, an upset stomach, or dizziness.  Last Reviewed Date  2020-04-22  Consumer Information Use and Disclaimer  This generalized information is a limited summary of diagnosis, treatment, and/or medication information. It is not meant to be comprehensive and should be used as a tool to help the user understand and/or assess potential diagnostic and treatment  options. It does NOT include all information about conditions, treatments, medications, side effects, or risks that may apply to a specific patient. It is not intended to be medical advice or a substitute for the medical advice, diagnosis, or treatment of a health care provider based on the health care provider's examination and assessment of a patient’s specific and unique circumstances. Patients must speak with a health care provider for complete information about their health, medical questions, and treatment options, including any risks or benefits regarding use of medications. This information does not endorse any treatments or medications as safe, effective, or approved for treating a specific patient. [employer] and its affiliates disclaim any warranty or liability relating to this information or the use thereof. The use of this information is governed by the Terms of Use, available at https://www.[employer]/en/know/clinical-effectiveness-terms  Copyright © 2023 [employer] and its affiliates and/or licensors. All rights reserved.Arthroscopy Discharge Instructions    About this topic  Arthroscopy is a type of surgery to check for and treat joint problems. A joint is a part of the body where two bones meet. The doctor puts a small tube with a tiny camera on the end into the joint. This is called an arthroscope. The doctor uses it to see pictures of the joint inside your body. This helps your doctor to find out what the problem is in the joint. Then, the doctor can pass tiny instruments into the joint to fix the problem.  This surgery is often done on the knee and shoulder, but may also be done for the hip, elbow, or wrist. It is done to:  Find an injury or problem inside a joint  Repair tendons and ligaments in your joint  Take out bone and cartilage fragments  Remove the joint lining that may be swollen or inflamed  Remove scar tissue that could be causing joint stiffness    What care is needed  at home?  Ask your doctor what you need to do when you go home. Make sure you ask questions if you do not understand what the doctor says. This way you will know what you need to do.  Prop your painful part on pillows to help with swelling.  Place an ice pack or a bag of frozen peas wrapped in a towel over the painful part. Never put ice right on the skin. Do not leave the ice on more than 10 to 15 minutes at a time.  Talk to your doctor about how to care for your cut site. Ask your doctor about:  When you should change your bandages  When you may take a bath or shower  If you need to be careful with lifting things over 10 pounds (4.5 kg) if you have had surgery on your arm or hand  If you need to be careful with walking and how much weight you put on your leg if you had surgery on your foot, knee, or hip  When you may go back to your normal activities like work, exercising, or driving  Wash your hands before and after touching your wound or dressing.  What follow-up care is needed?  Your doctor may ask you to make visits to the office to check on your progress. Be sure to keep these visits.  If you have stitches or staples, you will need to have them taken out. Your doctor will often want to do this in 1 to 2 weeks.  Your doctor may suggest physical or occupational therapy or exercises to build muscle strength, increase motion, and help you to recover faster.  What drugs may be needed?  The doctor may order drugs to:  Help with pain and swelling  Prevent infection  Prevent blood clots  Will physical activity be limited?  You may have to limit your activity. Talk to your doctor about the right amount of activity for you.  What problems could happen?  Infection  Bleeding  Swelling  Blood clots  Nerve and blood vessel injury  Joint stiffness  Ongoing pain  When do I need to call the doctor?  Signs of infection. These include a fever of 100.4°F (38°C) or higher, chills, wound that will not heal.  Signs of wound  infection. These include swelling, redness, warmth around the wound; too much pain when touched; yellowish, greenish, or bloody discharge; foul smell coming from the cut site; cut site opens up.  Increased numbness or tingling  Too much pain that is not helped by drugs you are taking  You are not feeling better in 2 to 3 days or you are feeling worse  Teach Back: Helping You Understand  The Teach Back Method helps you understand the information we are giving you. After you talk with the staff, tell them in your own words what you learned. This helps to make sure the staff has described each thing clearly. It also helps to explain things that may have been confusing. Before going home, make sure you can do these:  I can tell you about my procedure.  I can tell you how to care for my cut site.  I can tell you what may help ease my pain.  I can tell you what I will do if I have swelling, redness, or warmth around my wound or numbness or tingling.  Last Reviewed Date  2020-08-25  Consumer Information Use and Disclaimer  This generalized information is a limited summary of diagnosis, treatment, and/or medication information. It is not meant to be comprehensive and should be used as a tool to help the user understand and/or assess potential diagnostic and treatment options. It does NOT include all information about conditions, treatments, medications, side effects, or risks that may apply to a specific patient. It is not intended to be medical advice or a substitute for the medical advice, diagnosis, or treatment of a health care provider based on the health care provider's examination and assessment of a patient’s specific and unique circumstances. Patients must speak with a health care provider for complete information about their health, medical questions, and treatment options, including any risks or benefits regarding use of medications. This information does not endorse any treatments or medications as safe,  effective, or approved for treating a specific patient. Up[employer], [employer] and its affiliates disclaim any warranty or liability relating to this information or the use thereof. The use of this information is governed by the Terms of Use, available at https://www.[employer].com/en/know/clinical-effectiveness-terms  Copyright © 2024 [employer], [employer] and its affiliates and/or licensors. All rights reserved.Arthroscopy Discharge Instructions    About this topic  Arthroscopy is a type of surgery to check for and treat joint problems. A joint is a part of the body where two bones meet. The doctor puts a small tube with a tiny camera on the end into the joint. This is called an arthroscope. The doctor uses it to see pictures of the joint inside your body. This helps your doctor to find out what the problem is in the joint. Then, the doctor can pass tiny instruments into the joint to fix the problem.  This surgery is often done on the knee and shoulder, but may also be done for the hip, elbow, or wrist. It is done to:  Find an injury or problem inside a joint  Repair tendons and ligaments in your joint  Take out bone and cartilage fragments  Remove the joint lining that may be swollen or inflamed  Remove scar tissue that could be causing joint stiffness    What care is needed at home?  Ask your doctor what you need to do when you go home. Make sure you ask questions if you do not understand what the doctor says. This way you will know what you need to do.  Prop your painful part on pillows to help with swelling.  Place an ice pack or a bag of frozen peas wrapped in a towel over the painful part. Never put ice right on the skin. Do not leave the ice on more than 10 to 15 minutes at a time.  Talk to your doctor about how to care for your cut site. Ask your doctor about:  When you should change your bandages  When you may take a bath or shower  If you need to be careful with lifting things over 10 pounds (4.5 kg) if you have had  surgery on your arm or hand  If you need to be careful with walking and how much weight you put on your leg if you had surgery on your foot, knee, or hip  When you may go back to your normal activities like work, exercising, or driving  Wash your hands before and after touching your wound or dressing.  What follow-up care is needed?  Your doctor may ask you to make visits to the office to check on your progress. Be sure to keep these visits.  If you have stitches or staples, you will need to have them taken out. Your doctor will often want to do this in 1 to 2 weeks.  Your doctor may suggest physical or occupational therapy or exercises to build muscle strength, increase motion, and help you to recover faster.  What drugs may be needed?  The doctor may order drugs to:  Help with pain and swelling  Prevent infection  Prevent blood clots  Will physical activity be limited?  You may have to limit your activity. Talk to your doctor about the right amount of activity for you.  What problems could happen?  Infection  Bleeding  Swelling  Blood clots  Nerve and blood vessel injury  Joint stiffness  Ongoing pain  When do I need to call the doctor?  Signs of infection. These include a fever of 100.4°F (38°C) or higher, chills, wound that will not heal.  Signs of wound infection. These include swelling, redness, warmth around the wound; too much pain when touched; yellowish, greenish, or bloody discharge; foul smell coming from the cut site; cut site opens up.  Increased numbness or tingling  Too much pain that is not helped by drugs you are taking  You are not feeling better in 2 to 3 days or you are feeling worse  Teach Back: Helping You Understand  The Teach Back Method helps you understand the information we are giving you. After you talk with the staff, tell them in your own words what you learned. This helps to make sure the staff has described each thing clearly. It also helps to explain things that may have been  confusing. Before going home, make sure you can do these:  I can tell you about my procedure.  I can tell you how to care for my cut site.  I can tell you what may help ease my pain.  I can tell you what I will do if I have swelling, redness, or warmth around my wound or numbness or tingling.  Last Reviewed Date  2020-08-25  Consumer Information Use and Disclaimer  This generalized information is a limited summary of diagnosis, treatment, and/or medication information. It is not meant to be comprehensive and should be used as a tool to help the user understand and/or assess potential diagnostic and treatment options. It does NOT include all information about conditions, treatments, medications, side effects, or risks that may apply to a specific patient. It is not intended to be medical advice or a substitute for the medical advice, diagnosis, or treatment of a health care provider based on the health care provider's examination and assessment of a patient’s specific and unique circumstances. Patients must speak with a health care provider for complete information about their health, medical questions, and treatment options, including any risks or benefits regarding use of medications. This information does not endorse any treatments or medications as safe, effective, or approved for treating a specific patient. Up[employer]Date, Inc. and its affiliates disclaim any warranty or liability relating to this information or the use thereof. The use of this information is governed by the Terms of Use, available at https://www.[employer].com/en/know/clinical-effectiveness-terms  Copyright © 2024 [employer], [employer]. and its affiliates and/or licensors. All rights reserved.

## 2024-01-18 ASSESSMENT — PAIN SCALES - GENERAL: PAIN_LEVEL: 1

## 2024-01-18 NOTE — ANESTHESIA POSTPROCEDURE EVALUATION
"Patient: Dayday Galarza \"Curtis\"    Procedure Summary       Date: 01/17/24 Room / Location: [facility] / [facility]    Anesthesia Start: 0849 Anesthesia Stop: 0955    Procedure: Meniscectomy Arthroscopy Knee PARTIAL MEDIAL & LATERAL (Left: Knee) Diagnosis:       Other tear of lateral meniscus, current injury, left knee, initial encounter      Chondromalacia, left knee      Acute medial meniscus tear, left, subsequent encounter      (Other tear of lateral meniscus, current injury, left knee, initial encounter [S83.282A])      (Chondromalacia, left knee [M94.262])    Surgeons: Vaibhav Pichardo MD Responsible Provider: Marlon Sims MD    Anesthesia Type: general ASA Status: 2            Anesthesia Type: general    Vitals Value Taken Time   /87 01/17/24 1057   Temp 36.2 °C (97.2 °F) 01/17/24 1047   Pulse 78 01/17/24 1052   Resp 21 01/17/24 1052   SpO2 100 % 01/17/24 1100   Vitals shown include unvalidated device data.    Anesthesia Post Evaluation    Patient location during evaluation: PACU  Patient participation: complete - patient participated  Level of consciousness: awake  Pain score: 1  Pain management: adequate  Multimodal analgesia pain management approach  Airway patency: patent  Cardiovascular status: acceptable and hemodynamically stable  Respiratory status: acceptable, nonlabored ventilation and room air  Hydration status: acceptable  Postoperative Nausea and Vomiting: none        No notable events documented.    "

## 2024-01-23 ENCOUNTER — TELEPHONE (OUTPATIENT)
Dept: ORTHOPEDIC SURGERY | Facility: CLINIC | Age: 49
End: 2024-01-23

## 2024-01-23 ENCOUNTER — OFFICE VISIT (OUTPATIENT)
Dept: ORTHOPEDIC SURGERY | Facility: CLINIC | Age: 49
End: 2024-01-23
Payer: COMMERCIAL

## 2024-01-23 DIAGNOSIS — Z98.890 S/P LEFT KNEE ARTHROSCOPY: Primary | ICD-10-CM

## 2024-01-23 PROCEDURE — 1036F TOBACCO NON-USER: CPT | Performed by: ORTHOPAEDIC SURGERY

## 2024-01-23 PROCEDURE — 99024 POSTOP FOLLOW-UP VISIT: CPT | Performed by: ORTHOPAEDIC SURGERY

## 2024-01-23 ASSESSMENT — PAIN SCALES - GENERAL: PAINLEVEL_OUTOF10: 5 - MODERATE PAIN

## 2024-01-23 ASSESSMENT — PAIN - FUNCTIONAL ASSESSMENT: PAIN_FUNCTIONAL_ASSESSMENT: 0-10

## 2024-01-23 NOTE — TELEPHONE ENCOUNTER
Patient was called and advised he was to do a stationary bike at home at this time, and PT would be addressed at his follow up visit.  Patient stated understading.

## 2024-01-23 NOTE — PROGRESS NOTES
No chief complaint on file.      The patient is 1 weeks status post knee arthroscopy.  Their pain is intermittent.    Physical examination:    The portal sites are healed.  There is mild effusion.  No erythema or warmth.  Knee range of motion -5 and 120.  Calf is soft, Homans negative.    Impression: Status post side: left knee arthroscopy with medial and lateral menisectomy    Plan:  Sutures removed  Intraoperative images reviewed with the patient  Physical therapy: No  Follow up in 3 weeks  All questions answered

## 2024-02-13 ENCOUNTER — OFFICE VISIT (OUTPATIENT)
Dept: ORTHOPEDIC SURGERY | Facility: CLINIC | Age: 49
End: 2024-02-13
Payer: COMMERCIAL

## 2024-02-13 DIAGNOSIS — G89.29 CHRONIC PAIN OF LEFT KNEE: ICD-10-CM

## 2024-02-13 DIAGNOSIS — Z98.890 S/P LEFT KNEE ARTHROSCOPY: Primary | ICD-10-CM

## 2024-02-13 DIAGNOSIS — M25.562 CHRONIC PAIN OF LEFT KNEE: ICD-10-CM

## 2024-02-13 DIAGNOSIS — S83.282A ACUTE LATERAL MENISCUS TEAR OF LEFT KNEE, INITIAL ENCOUNTER: ICD-10-CM

## 2024-02-13 PROCEDURE — 20610 DRAIN/INJ JOINT/BURSA W/O US: CPT | Performed by: ORTHOPAEDIC SURGERY

## 2024-02-13 PROCEDURE — L1812 KO ELASTIC W/JOINTS PRE OTS: HCPCS | Performed by: ORTHOPAEDIC SURGERY

## 2024-02-13 PROCEDURE — 99024 POSTOP FOLLOW-UP VISIT: CPT | Performed by: ORTHOPAEDIC SURGERY

## 2024-02-13 PROCEDURE — 1036F TOBACCO NON-USER: CPT | Performed by: ORTHOPAEDIC SURGERY

## 2024-02-13 RX ORDER — LIDOCAINE HYDROCHLORIDE 10 MG/ML
1 INJECTION INFILTRATION; PERINEURAL
Status: COMPLETED | OUTPATIENT
Start: 2024-02-13 | End: 2024-02-13

## 2024-02-13 RX ORDER — BETAMETHASONE SODIUM PHOSPHATE AND BETAMETHASONE ACETATE 3; 3 MG/ML; MG/ML
6 INJECTION, SUSPENSION INTRA-ARTICULAR; INTRALESIONAL; INTRAMUSCULAR; SOFT TISSUE
Status: COMPLETED | OUTPATIENT
Start: 2024-02-13 | End: 2024-02-13

## 2024-02-13 RX ORDER — DICLOFENAC SODIUM 100 MG/1
100 TABLET, EXTENDED RELEASE ORAL DAILY
Qty: 30 TABLET | Refills: 0 | Status: SHIPPED | OUTPATIENT
Start: 2024-02-13 | End: 2024-03-14

## 2024-02-13 RX ADMIN — LIDOCAINE HYDROCHLORIDE 1 ML: 10 INJECTION INFILTRATION; PERINEURAL at 11:33

## 2024-02-13 RX ADMIN — BETAMETHASONE SODIUM PHOSPHATE AND BETAMETHASONE ACETATE 6 MG: 3; 3 INJECTION, SUSPENSION INTRA-ARTICULAR; INTRALESIONAL; INTRAMUSCULAR; SOFT TISSUE at 11:33

## 2024-02-13 ASSESSMENT — PAIN - FUNCTIONAL ASSESSMENT: PAIN_FUNCTIONAL_ASSESSMENT: 0-10

## 2024-02-13 ASSESSMENT — PAIN SCALES - GENERAL: PAINLEVEL_OUTOF10: 8

## 2024-02-13 NOTE — PROGRESS NOTES
No chief complaint on file.      The patient is 4 weeks status post knee arthroscopy.  Their pain is severe.  He has parapatellar pain.  His knee also gives way.  He has difficulty with stairs.    Physical examination:    The portal sites are healed.  There is  no  effusion.  No erythema or warmth.  Knee range of motion  0-135 degrees .  There is significant quadriceps atrophy on the left as compared to the right  He has parapatellar pain on palpation.  There is no tenderness over the anterior medial knee where he was painful before.  Latif's is negative stressing the lateral compartment.  Calf is soft, Homans negative.    Impression: Status post side: left knee arthroscopy with partial lateral meniscectomy and joint debridement    Plan:  We had a lengthy discussion about options  We will proceed with a corticosteroid injection  Voltaren 100 mg daily  Basic hinged knee brace  Physical therapy: Yes 4 corticosteroid injection to the knee  Follow up in 1 month  Consider submitting for viscoelastic supplement if continue to have pain  All questions answered    L Inj/Asp: L knee on 2/13/2024 11:33 AM  Indications: pain  Details: 21 G needle, anterolateral approach  Medications: 1 mL lidocaine 10 mg/mL (1 %); 6 mg betamethasone acet,sod phos 6 mg/mL  Outcome: tolerated well, no immediate complications  Procedure, treatment alternatives, risks and benefits explained, specific risks discussed. Consent was given by the patient. Immediately prior to procedure a time out was called to verify the correct patient, procedure, equipment, support staff and site/side marked as required. Patient was prepped and draped in the usual sterile fashion.

## 2024-03-11 NOTE — PROGRESS NOTES
No chief complaint on file.      The patient is 2 months status post knee arthroscopy.  Their pain is moderate.  He complains of a posterior pain that radiates proximal and distal.  It is after he does any heavy lifting type exercises at therapy.  He is able to walk and ride a bike.    Physical examination:    The portal sites are healed.  There is mild effusion.  No erythema or warmth.  Knee range of motion  0-130 degrees .  Calf is soft, Homans negative.    Impression: Status post side: left knee arthroscopy with partial lateral meniscectomy  Chondromalacia and early osteoarthrosis left knee    Plan:  I had a lengthy discussion with the patient.  The corticosteroid injection I had done worked well but he still having intermittent pain.  I have submitted for Euflexxa injections which we will start today.  He will be returning to work in 2 weeks  Follow-up next week for the second injection  Physical therapy: Yes  All questions answered    L Inj/Asp: L knee on 3/12/2024 10:01 AM  Details: 21 G needle, superolateral approach  Medications: 20 mg sodium hyaluronate 10 mg/mL(mw 2.4 -3.6 million)  Aspirate: 3 mL serous  Outcome: tolerated well, no immediate complications  Procedure, treatment alternatives, risks and benefits explained, specific risks discussed. Consent was given by the patient. Immediately prior to procedure a time out was called to verify the correct patient, procedure, equipment, support staff and site/side marked as required. Patient was prepped and draped in the usual sterile fashion.

## 2024-03-12 ENCOUNTER — OFFICE VISIT (OUTPATIENT)
Dept: ORTHOPEDIC SURGERY | Facility: CLINIC | Age: 49
End: 2024-03-12
Payer: COMMERCIAL

## 2024-03-12 DIAGNOSIS — M17.12 PRIMARY OSTEOARTHRITIS OF LEFT KNEE: Primary | ICD-10-CM

## 2024-03-12 PROCEDURE — 20610 DRAIN/INJ JOINT/BURSA W/O US: CPT | Performed by: ORTHOPAEDIC SURGERY

## 2024-03-12 PROCEDURE — 99024 POSTOP FOLLOW-UP VISIT: CPT | Performed by: ORTHOPAEDIC SURGERY

## 2024-03-12 PROCEDURE — 1036F TOBACCO NON-USER: CPT | Performed by: ORTHOPAEDIC SURGERY

## 2024-03-12 ASSESSMENT — PAIN SCALES - GENERAL: PAINLEVEL_OUTOF10: 3

## 2024-03-12 ASSESSMENT — PAIN - FUNCTIONAL ASSESSMENT: PAIN_FUNCTIONAL_ASSESSMENT: 0-10

## 2024-03-14 ENCOUNTER — OFFICE VISIT (OUTPATIENT)
Dept: PRIMARY CARE | Facility: CLINIC | Age: 49
End: 2024-03-14
Payer: COMMERCIAL

## 2024-03-14 VITALS
HEIGHT: 73 IN | WEIGHT: 232 LBS | TEMPERATURE: 98.1 F | SYSTOLIC BLOOD PRESSURE: 136 MMHG | DIASTOLIC BLOOD PRESSURE: 84 MMHG | BODY MASS INDEX: 30.75 KG/M2 | HEART RATE: 90 BPM

## 2024-03-14 DIAGNOSIS — Z00.00 ENCOUNTER FOR WELLNESS EXAMINATION: Primary | ICD-10-CM

## 2024-03-14 DIAGNOSIS — R39.9 URINARY TRACT INFECTION SYMPTOMS: ICD-10-CM

## 2024-03-14 DIAGNOSIS — I10 ESSENTIAL (PRIMARY) HYPERTENSION: ICD-10-CM

## 2024-03-14 DIAGNOSIS — E78.2 MIXED DYSLIPIDEMIA: ICD-10-CM

## 2024-03-14 DIAGNOSIS — E55.9 VITAMIN D INSUFFICIENCY: ICD-10-CM

## 2024-03-14 DIAGNOSIS — J45.21 MILD INTERMITTENT ASTHMA WITH ACUTE EXACERBATION (HHS-HCC): ICD-10-CM

## 2024-03-14 DIAGNOSIS — I67.1 CEREBRAL ANEURYSM, NONRUPTURED (HHS-HCC): ICD-10-CM

## 2024-03-14 PROBLEM — J01.01 ACUTE RECURRENT MAXILLARY SINUSITIS: Status: RESOLVED | Noted: 2023-12-22 | Resolved: 2024-03-14

## 2024-03-14 PROBLEM — R11.2 PONV (POSTOPERATIVE NAUSEA AND VOMITING): Status: RESOLVED | Noted: 2024-01-17 | Resolved: 2024-03-14

## 2024-03-14 PROBLEM — Z98.890 PONV (POSTOPERATIVE NAUSEA AND VOMITING): Status: RESOLVED | Noted: 2024-01-17 | Resolved: 2024-03-14

## 2024-03-14 PROBLEM — J00 NASOPHARYNGITIS: Status: RESOLVED | Noted: 2023-12-22 | Resolved: 2024-03-14

## 2024-03-14 PROCEDURE — 3075F SYST BP GE 130 - 139MM HG: CPT | Performed by: INTERNAL MEDICINE

## 2024-03-14 PROCEDURE — 3079F DIAST BP 80-89 MM HG: CPT | Performed by: INTERNAL MEDICINE

## 2024-03-14 PROCEDURE — 99396 PREV VISIT EST AGE 40-64: CPT | Performed by: INTERNAL MEDICINE

## 2024-03-14 ASSESSMENT — PATIENT HEALTH QUESTIONNAIRE - PHQ9
SUM OF ALL RESPONSES TO PHQ9 QUESTIONS 1 AND 2: 0
2. FEELING DOWN, DEPRESSED OR HOPELESS: NOT AT ALL
1. LITTLE INTEREST OR PLEASURE IN DOING THINGS: NOT AT ALL

## 2024-03-14 ASSESSMENT — ENCOUNTER SYMPTOMS: DEPRESSION: 0

## 2024-03-14 NOTE — PROGRESS NOTES
"Dayday Galarza is otherwise doing well. Chronic medical conditions are stable with current medical regimen. Cognitive functions are intact and stable. Immunizations are age appropriately up-to-date. Today patient does not have any acute medical complaint. We reconciled home medications. . Discussed about the preventative suri like cancer screening, routine blood work, immunizations. The healthy lifestyle has been reinforced. Encouraged continued avoidance of tobacco alcohol substances of abuse. Functional capacity has been assessed. The depression screening questionnaire has been reviewed. Discussed safety measures and advanced directives, Med refills were sent.  Vital signs reviewed.  The due lab orders, if any were provided.  All the other components of annual wellness has been further narrated below. Patient will return for follow up as per schedule.       Review of Systems   Constitutional:  Negative for activity change.   HENT:  Negative for congestion and sore throat.    Respiratory:  Negative for cough.    Cardiovascular:  Negative for chest pain.   Gastrointestinal:  Negative for abdominal pain.   Endocrine: Negative for polyuria.   Genitourinary:  Negative for dysuria.   Musculoskeletal:  Negative for myalgias.   Skin:  Negative for rash.   Neurological:  Negative for light-headedness.   Psychiatric/Behavioral:  Negative for behavioral problems.        Visit Vitals  /84 (BP Location: Right arm, Patient Position: Sitting, BP Cuff Size: Adult)   Pulse 90   Temp 36.7 °C (98.1 °F) (Temporal)   Ht 1.854 m (6' 1\")   Wt 105 kg (232 lb)   BMI 30.61 kg/m²   Smoking Status Never   BSA 2.33 m²           Wt Readings from Last 10 Encounters:   03/14/24 105 kg (232 lb)   01/17/24 95.7 kg (210 lb 15.7 oz)   01/03/24 98.1 kg (216 lb 4 oz)   09/12/23 105 kg (231 lb)   08/24/23 105 kg (232 lb)   06/19/23 104 kg (230 lb)   02/01/23 107 kg (236 lb)   11/23/22 105 kg (232 lb)   04/13/22 107 kg (235 lb 7.2 oz) "   03/07/22 102 kg (225 lb 1.4 oz)        Physical Exam  Vitals and nursing note reviewed.   Constitutional:       Appearance: Normal appearance.   HENT:      Head: Normocephalic.      Right Ear: Tympanic membrane normal.      Left Ear: Tympanic membrane normal.      Nose: Nose normal.      Mouth/Throat:      Mouth: Mucous membranes are moist.   Cardiovascular:      Rate and Rhythm: Normal rate and regular rhythm.      Pulses: Normal pulses.      Heart sounds: No murmur heard.  Pulmonary:      Effort: No respiratory distress.      Breath sounds: Normal breath sounds.   Abdominal:      Palpations: Abdomen is soft.   Musculoskeletal:      Cervical back: Neck supple.      Right lower leg: No edema.      Left lower leg: No edema.   Skin:     General: Skin is warm.      Findings: No rash.   Neurological:      General: No focal deficit present.      Mental Status: He is alert and oriented to person, place, and time.   Psychiatric:         Mood and Affect: Mood normal.            RECENT LABS:  Lab Results   Component Value Date    WBC 5.8 01/15/2024    HGB 14.6 01/15/2024    HCT 45.5 01/15/2024     01/15/2024    CHOL 183 08/25/2023    TRIG 224 (H) 08/25/2023    HDL 39.2 (A) 08/25/2023    ALT 18 01/15/2024    AST 14 01/15/2024     01/15/2024    K 4.1 01/15/2024     01/15/2024    CREATININE 1.06 01/15/2024    BUN 24 (H) 01/15/2024    CO2 28 01/15/2024    TSH 1.73 08/25/2023    INR 1.1 01/15/2024    HGBA1C 5.2 08/25/2023       MEDICATIONS:   Current Outpatient Medications   Medication Instructions    albuterol 0.63 mg, nebulization, Every 6 hours PRN    cholecalciferol (Vitamin D-3) 50,000 unit capsule Take 1 capsule weekly for 12 weeks    diclofenac sodium (VOLTAREN XR) 100 mg, oral, Daily    erenumab (Aimovig Autoinjector) 70 mg/mL injection     hydrALAZINE (APRESOLINE) 100 mg, oral, 2 times daily    pantoprazole (PROTONIX) 40 mg, oral, As needed        TODAY'S VISIT  DX:   1. Encounter for wellness  examination  Comprehensive Metabolic Panel    Lipid Panel    Prostate Specific Antigen    TSH with reflex to Free T4 if abnormal    Urinalysis with Reflex Microscopic      2. Cerebral aneurysm, nonruptured        3. Mild intermittent asthma with acute exacerbation        4. Essential (primary) hypertension  CBC and Auto Differential      5. Mixed dyslipidemia  Lipid Panel      6. Urinary tract infection symptoms  Urinalysis with Reflex Microscopic      7. Vitamin D insufficiency  Vitamin D 25-Hydroxy,Total (for eval of Vitamin D levels)         MEDICAL DECISION MAKING:   Recent lab work and relevant imaging studies have been reviewed.  Relevant correspondence/notes from specialty consultants were reviewed and discussed with patient.  The current active medical co morbidities have been considered.  Patient's cancer screening tests are up-to-date.  Medication have been sent for refill.  Patient will continue with current medical therapy and plan.  Immunizations are up-to-date.  Relevant orders are in the chart for this visit.  I will see patient back in 3 months.      PS: This note was completed using Dragon voice recognition technology and may include unintended errors with respect to translation of words, typographical errors or grammar errors which may not have been identified while finalizing the chart.

## 2024-03-19 ENCOUNTER — OFFICE VISIT (OUTPATIENT)
Dept: ORTHOPEDIC SURGERY | Facility: CLINIC | Age: 49
End: 2024-03-19
Payer: COMMERCIAL

## 2024-03-19 DIAGNOSIS — M17.12 PRIMARY OSTEOARTHRITIS OF LEFT KNEE: Primary | ICD-10-CM

## 2024-03-19 PROCEDURE — 20610 DRAIN/INJ JOINT/BURSA W/O US: CPT | Performed by: ORTHOPAEDIC SURGERY

## 2024-03-19 PROCEDURE — 1036F TOBACCO NON-USER: CPT | Performed by: ORTHOPAEDIC SURGERY

## 2024-03-19 PROCEDURE — 99024 POSTOP FOLLOW-UP VISIT: CPT | Performed by: ORTHOPAEDIC SURGERY

## 2024-03-19 ASSESSMENT — PAIN - FUNCTIONAL ASSESSMENT: PAIN_FUNCTIONAL_ASSESSMENT: 0-10

## 2024-03-19 ASSESSMENT — PAIN SCALES - GENERAL: PAINLEVEL_OUTOF10: 4

## 2024-03-19 NOTE — PROGRESS NOTES
History of Present Illness   The patient is here for the left knee Euflexxa injection     Review of Systems   GENERAL: Negative for malaise, significant weight loss, fever  MUSCULOSKELETAL: see HPI  NEURO:  Negative     Physical Exam  This is a male in no acute distress  The skin is intact about the left knee     Imaging  None today     Assessment   Osteoarthrosis left knee     Plan  Second Euflexxa injection  Follow-up next week for the third  L Inj/Asp: L knee on 3/19/2024 8:10 AM  Indications: pain  Details: 20 G needle, anterolateral approach  Medications: 20 mg sodium hyaluronate 10 mg/mL(mw 2.4 -3.6 million)  Outcome: tolerated well, no immediate complications  Procedure, treatment alternatives, risks and benefits explained, specific risks discussed. Consent was given by the patient. Immediately prior to procedure a time out was called to verify the correct patient, procedure, equipment, support staff and site/side marked as required. Patient was prepped and draped in the usual sterile fashion.

## 2024-03-29 ASSESSMENT — ENCOUNTER SYMPTOMS
ACTIVITY CHANGE: 0
ABDOMINAL PAIN: 0
MYALGIAS: 0
COUGH: 0
LIGHT-HEADEDNESS: 0
DYSURIA: 0
SORE THROAT: 0

## 2024-04-01 NOTE — PROGRESS NOTES
History of Present Illness   The patient is here for the third Euflexxa injection to the left knee     Review of Systems   GENERAL: Negative for malaise, significant weight loss, fever  MUSCULOSKELETAL: see HPI  NEURO:  Negative     Physical Exam  Left knee:  The extensor mechanism is intact  The skin is intact, no erythema or warmth     Imaging  None today     Assessment   Osteoarthrosis left knee     Plan  Third Euflexxa injection  Follow-up in 2 months    L Inj/Asp: L knee on 4/2/2024 8:22 AM  Indications: pain  Details: 20 G needle, anterolateral approach  Medications: 20 mg sodium hyaluronate 10 mg/mL(mw 2.4 -3.6 million)  Outcome: tolerated well, no immediate complications  Procedure, treatment alternatives, risks and benefits explained, specific risks discussed. Consent was given by the patient. Immediately prior to procedure a time out was called to verify the correct patient, procedure, equipment, support staff and site/side marked as required. Patient was prepped and draped in the usual sterile fashion.

## 2024-04-02 ENCOUNTER — OFFICE VISIT (OUTPATIENT)
Dept: ORTHOPEDIC SURGERY | Facility: CLINIC | Age: 49
End: 2024-04-02
Payer: COMMERCIAL

## 2024-04-02 DIAGNOSIS — M17.12 PRIMARY OSTEOARTHRITIS OF LEFT KNEE: Primary | ICD-10-CM

## 2024-04-02 PROCEDURE — 20610 DRAIN/INJ JOINT/BURSA W/O US: CPT | Performed by: PHYSICIAN ASSISTANT

## 2024-06-04 ENCOUNTER — APPOINTMENT (OUTPATIENT)
Dept: ORTHOPEDIC SURGERY | Facility: CLINIC | Age: 49
End: 2024-06-04
Payer: COMMERCIAL

## 2024-06-20 ENCOUNTER — OFFICE VISIT (OUTPATIENT)
Dept: ORTHOPEDIC SURGERY | Facility: CLINIC | Age: 49
End: 2024-06-20
Payer: COMMERCIAL

## 2024-06-20 DIAGNOSIS — M17.12 PRIMARY OSTEOARTHRITIS OF LEFT KNEE: Primary | ICD-10-CM

## 2024-06-20 PROCEDURE — 20610 DRAIN/INJ JOINT/BURSA W/O US: CPT | Mod: LT | Performed by: ORTHOPAEDIC SURGERY

## 2024-06-20 PROCEDURE — 2500000005 HC RX 250 GENERAL PHARMACY W/O HCPCS: Performed by: ORTHOPAEDIC SURGERY

## 2024-06-20 PROCEDURE — 99213 OFFICE O/P EST LOW 20 MIN: CPT | Performed by: ORTHOPAEDIC SURGERY

## 2024-06-20 PROCEDURE — 2500000004 HC RX 250 GENERAL PHARMACY W/ HCPCS (ALT 636 FOR OP/ED): Performed by: ORTHOPAEDIC SURGERY

## 2024-06-20 RX ORDER — LIDOCAINE HYDROCHLORIDE 10 MG/ML
1 INJECTION INFILTRATION; PERINEURAL
Status: COMPLETED | OUTPATIENT
Start: 2024-06-20 | End: 2024-06-20

## 2024-06-20 RX ORDER — BETAMETHASONE SODIUM PHOSPHATE AND BETAMETHASONE ACETATE 3; 3 MG/ML; MG/ML
1 INJECTION, SUSPENSION INTRA-ARTICULAR; INTRALESIONAL; INTRAMUSCULAR; SOFT TISSUE
Status: COMPLETED | OUTPATIENT
Start: 2024-06-20 | End: 2024-06-20

## 2024-06-20 NOTE — PROGRESS NOTES
History of Present Illness  Chief Complaint   Patient presents with    Left Knee - Pain     S/P Euflexxa #3 4/2/2024       Patient with known osteoarthritis of the side: left knee who presents today for repeat evaluation.  The patient notes worsening knee pain.  Complains more of an anterior knee pain.  He has pain with stairs mainly.  The patient notes worsening mechanical symptoms.  The patient has tried the following modalities Rest, ice, elevation, Tylenol, NSAIDS, Injections, and previous knee arthroscopy and menisectomy .  The patient also had an ACL reconstruction.    Past Medical History:   Diagnosis Date    Asthma ([facility]-HCC)     COVID-19     NOT VACCINATED    GERD (gastroesophageal reflux disease)     Hypertension     Left elbow pain        Medication Documentation Review Audit       Reviewed by Antonia Guy MA (Medical Assistant) on 06/20/24 at 0816      Medication Order Taking? Sig Documenting Provider Last Dose Status   albuterol 0.63 mg/3 mL nebulizer solution [id number] No Take 3 mL (0.63 mg) by nebulization every 6 hours if needed for wheezing. Historical Provider, MD Taking Active   cholecalciferol (Vitamin D-3) 50,000 unit capsule [id number] No Take 1 capsule weekly for 12 weeks Historical Provider, MD Taking Active   erenumab (Aimovig Autoinjector) 70 mg/mL injection [id number] No  Historical Provider, MD Taking Active   hydrALAZINE (Apresoline) 100 mg tablet [id number] No Take 1 tablet (100 mg) by mouth 2 times a day. Historical Provider, MD Taking Active   pantoprazole (ProtoNix) 40 mg EC tablet [id number] No Take 1 tablet (40 mg) by mouth if needed. Historical Provider, MD Taking Active                    Allergies   Allergen Reactions    Sumatriptan Other     Chest pain with SOB    Tizanidine Confusion and Hives    Opioids - Morphine Analogues Other    Flexeril [Cyclobenzaprine] Rash       Social History     Socioeconomic History    Marital status:      Spouse name: Not on file    Number of  children: Not on file    Years of education: Not on file    Highest education level: Not on file   Occupational History    Not on file   Tobacco Use    Smoking status: Never    Smokeless tobacco: Never   Vaping Use    Vaping status: Never Used   Substance and Sexual Activity    Alcohol use: Never    Drug use: Never    Sexual activity: Yes     Partners: Female     Comment: SPOUSE   Other Topics Concern    Not on file   Social History Narrative    Not on file     Social Determinants of Health     Financial Resource Strain: Medium Risk (10/2/2023)    Received from [facility]    Overall Financial Resource Strain (CARDIA)     Difficulty of Paying Living Expenses: Somewhat hard   Food Insecurity: No Food Insecurity (10/2/2023)    Received from [facility]    Hunger Vital Sign     Worried About Running Out of Food in the Last Year: Never true     Ran Out of Food in the Last Year: Never true   Transportation Needs: No Transportation Needs (10/2/2023)    Received from [facility]    PRAPARE - Transportation     Lack of Transportation (Medical): No     Lack of Transportation (Non-Medical): No   Physical Activity: Sufficiently Active (10/2/2023)    Received from [facility]    Exercise Vital Sign     Days of Exercise per Week: 7 days     Minutes of Exercise per Session: 150+ min   Stress: No Stress Concern Present (10/2/2023)    Received from [facility]    [employer] [place] [employer] - Occupational Stress Questionnaire     Feeling of Stress : Only a little   Social Connections: Socially Integrated (10/2/2023)    Received from [facility]    Social Connection and Isolation Panel [NHANES]     Frequency of Communication with Friends and Family: More than three times a week     Frequency of Social Gatherings with Friends and Family: Once a week     Attends Zoroastrianism Services: More than 4 times per year     Active Member of Clubs or Organizations: Yes     Attends Club or Organization Meetings: More than 4 times per  year     Marital Status:    Intimate Partner Violence: Not At Risk (10/2/2023)    Received from [facility]    Humiliation, Afraid, Rape, and Kick questionnaire     Fear of Current or Ex-Partner: No     Emotionally Abused: No     Physically Abused: No     Sexually Abused: No   Housing Stability: Low Risk  (10/2/2023)    Received from [facility]    Housing Stability Vital Sign     Unable to Pay for Housing in the Last Year: No     Number of Places Lived in the Last Year: 1     Unstable Housing in the Last Year: No       Past Surgical History:   Procedure Laterality Date    OTHER SURGICAL HISTORY  12/04/2021    Colonoscopy    OTHER SURGICAL HISTORY  12/04/2021    Neck surgery    OTHER SURGICAL HISTORY Left 08/09/2022    Anterior cruciate ligament repair    OTHER SURGICAL HISTORY Left 08/09/2022    Meniscus repair            Review of Systems   GENERAL: Negative for malaise, significant weight loss, fever  MUSCULOSKELETAL: see HPI  NEURO:  Negative    BMI  30    Exam  side: left Knee:  Skin healthy and intact  No gross swelling or ecchymosis  Alignment: normal  Correctable:  Not applicable  Effusion:  None  ROM:  0-130 degrees  Crepitance with range of motion  No pain with internal rotation of the hip  Tenderness to palpation: lateral Pain with patellar compression: Yes  No laxity to valgus stress  No laxity to varus stress  Negative Lachman´s test  Negative posterior drawer test  negative Madiha´s test     Neurovascular exam normal distally  2+ DP pulse and good cap refill     Imaging  None today       Assessment  Patient with known osteoarthritis of the side: left knee     Plan  We reviewed an evidence-based approach to OA of the knee.  We discussed past treatments as well options for future treatment.  Left knee corticosteroid injection  L Inj/Asp: L knee on 6/20/2024 8:33 AM  Indications: pain  Details: 21 G needle, anterolateral approach  Medications: 1 mL lidocaine 10 mg/mL (1 %); 1 mg betamethasone  acet,sod phos 6 mg/mL  Outcome: tolerated well, no immediate complications  Procedure, treatment alternatives, risks and benefits explained, specific risks discussed. Consent was given by the patient. Immediately prior to procedure a time out was called to verify the correct patient, procedure, equipment, support staff and site/side marked as required. Patient was prepped and draped in the usual sterile fashion.       Follow up in 3 months  All questions answered

## 2024-09-06 ENCOUNTER — OFFICE VISIT (OUTPATIENT)
Dept: NEUROLOGY | Age: 49
End: 2024-09-06
Payer: MEDICAID

## 2024-09-06 ENCOUNTER — PATIENT MESSAGE (OUTPATIENT)
Dept: NEUROLOGY | Age: 49
End: 2024-09-06

## 2024-09-06 VITALS
DIASTOLIC BLOOD PRESSURE: 80 MMHG | HEART RATE: 71 BPM | BODY MASS INDEX: 30.74 KG/M2 | SYSTOLIC BLOOD PRESSURE: 120 MMHG | WEIGHT: 233 LBS

## 2024-09-06 DIAGNOSIS — G43.709 CHRONIC MIGRAINE WITHOUT AURA WITHOUT STATUS MIGRAINOSUS, NOT INTRACTABLE: Primary | ICD-10-CM

## 2024-09-06 DIAGNOSIS — G44.011 INTRACTABLE EPISODIC CLUSTER HEADACHE: ICD-10-CM

## 2024-09-06 PROCEDURE — G8427 DOCREV CUR MEDS BY ELIG CLIN: HCPCS | Performed by: PSYCHIATRY & NEUROLOGY

## 2024-09-06 PROCEDURE — 3074F SYST BP LT 130 MM HG: CPT | Performed by: PSYCHIATRY & NEUROLOGY

## 2024-09-06 PROCEDURE — 99214 OFFICE O/P EST MOD 30 MIN: CPT | Performed by: PSYCHIATRY & NEUROLOGY

## 2024-09-06 PROCEDURE — 1036F TOBACCO NON-USER: CPT | Performed by: PSYCHIATRY & NEUROLOGY

## 2024-09-06 PROCEDURE — 3079F DIAST BP 80-89 MM HG: CPT | Performed by: PSYCHIATRY & NEUROLOGY

## 2024-09-06 PROCEDURE — G8417 CALC BMI ABV UP PARAM F/U: HCPCS | Performed by: PSYCHIATRY & NEUROLOGY

## 2024-09-06 RX ORDER — ERENUMAB-AOOE 140 MG/ML
140 INJECTION, SOLUTION SUBCUTANEOUS
Qty: 1 ML | Refills: 11 | Status: SHIPPED | OUTPATIENT
Start: 2024-09-06 | End: 2024-10-06

## 2024-09-06 RX ORDER — KETOROLAC TROMETHAMINE 10 MG/1
TABLET, FILM COATED ORAL
Qty: 30 TABLET | Refills: 1 | Status: SHIPPED | OUTPATIENT
Start: 2024-09-06

## 2024-09-11 DIAGNOSIS — M17.12 PRIMARY OSTEOARTHRITIS OF LEFT KNEE: Primary | ICD-10-CM

## 2024-09-16 ENCOUNTER — APPOINTMENT (OUTPATIENT)
Dept: PRIMARY CARE | Facility: CLINIC | Age: 49
End: 2024-09-16
Payer: COMMERCIAL

## 2024-09-17 ENCOUNTER — HOSPITAL ENCOUNTER (OUTPATIENT)
Dept: RADIOLOGY | Facility: HOSPITAL | Age: 49
Discharge: HOME | End: 2024-09-17
Payer: COMMERCIAL

## 2024-09-17 ENCOUNTER — APPOINTMENT (OUTPATIENT)
Dept: ORTHOPEDIC SURGERY | Facility: CLINIC | Age: 49
End: 2024-09-17
Payer: COMMERCIAL

## 2024-09-17 DIAGNOSIS — M17.12 PRIMARY OSTEOARTHRITIS OF LEFT KNEE: ICD-10-CM

## 2024-09-17 DIAGNOSIS — M17.12 PRIMARY OSTEOARTHRITIS OF LEFT KNEE: Primary | ICD-10-CM

## 2024-09-17 PROCEDURE — 1036F TOBACCO NON-USER: CPT | Performed by: ORTHOPAEDIC SURGERY

## 2024-09-17 PROCEDURE — 99214 OFFICE O/P EST MOD 30 MIN: CPT | Performed by: ORTHOPAEDIC SURGERY

## 2024-09-17 PROCEDURE — 73564 X-RAY EXAM KNEE 4 OR MORE: CPT | Mod: LEFT SIDE | Performed by: STUDENT IN AN ORGANIZED HEALTH CARE EDUCATION/TRAINING PROGRAM

## 2024-09-17 PROCEDURE — 73564 X-RAY EXAM KNEE 4 OR MORE: CPT | Mod: LT

## 2024-09-17 NOTE — PROGRESS NOTES
History of Present Illness  No chief complaint on file.      Patient with known osteoarthritis of the side: left knee who presents today for repeat evaluation.  The patient notes worsening knee pain.  The patient notes worsening mechanical symptoms.  The patient has tried the following modalities Rest, ice, elevation, Physical therapy, NSAIDS, and Injections.      Past Medical History:   Diagnosis Date    Asthma ([facility])     COVID-19     NOT VACCINATED    GERD (gastroesophageal reflux disease)     Hypertension     Left elbow pain        Medication Documentation Review Audit       Reviewed by Antonia Guy MA (Medical Assistant) on 06/20/24 at 0816      Medication Order Taking? Sig Documenting Provider Last Dose Status   albuterol 0.63 mg/3 mL nebulizer solution [id number] No Take 3 mL (0.63 mg) by nebulization every 6 hours if needed for wheezing. Historical Provider, MD Taking Active   cholecalciferol (Vitamin D-3) 50,000 unit capsule [id number] No Take 1 capsule weekly for 12 weeks Historical Provider, MD Taking Active   erenumab (Aimovig Autoinjector) 70 mg/mL injection [id number] No  Historical Provider, MD Taking Active   hydrALAZINE (Apresoline) 100 mg tablet [id number] No Take 1 tablet (100 mg) by mouth 2 times a day. Historical Provider, MD Taking Active   pantoprazole (ProtoNix) 40 mg EC tablet [id number] No Take 1 tablet (40 mg) by mouth if needed. Historical Provider, MD Taking Active                    Allergies   Allergen Reactions    Sumatriptan Other     Chest pain with SOB    Tizanidine Confusion and Hives    Opioids - Morphine Analogues Other    Flexeril [Cyclobenzaprine] Rash       Social History     Socioeconomic History    Marital status:      Spouse name: Not on file    Number of children: Not on file    Years of education: Not on file    Highest education level: Not on file   Occupational History    Not on file   Tobacco Use    Smoking status: Never    Smokeless tobacco: Never   Vaping Use     Vaping status: Never Used   Substance and Sexual Activity    Alcohol use: Never    Drug use: Never    Sexual activity: Yes     Partners: Female     Comment: SPOUSE   Other Topics Concern    Not on file   Social History Narrative    Not on file     Social Determinants of Health     Financial Resource Strain: Medium Risk (10/2/2023)    Received from [facility]    Overall Financial Resource Strain (CARDIA)     Difficulty of Paying Living Expenses: Somewhat hard   Food Insecurity: No Food Insecurity (10/2/2023)    Received from [facility]    Hunger Vital Sign     Worried About Running Out of Food in the Last Year: Never true     Ran Out of Food in the Last Year: Never true   Transportation Needs: No Transportation Needs (10/2/2023)    Received from [facility]    PRAPARE - Transportation     Lack of Transportation (Medical): No     Lack of Transportation (Non-Medical): No   Physical Activity: Sufficiently Active (10/2/2023)    Received from [facility]    Exercise Vital Sign     Days of Exercise per Week: 7 days     Minutes of Exercise per Session: 150+ min   Stress: No Stress Concern Present (10/2/2023)    Received from [facility]    [facility] of Occupational Health - Occupational Stress Questionnaire     Feeling of Stress : Only a little   Social Connections: Socially Integrated (10/2/2023)    Received from [facility]    Social Connection and Isolation Panel [NHANES]     Frequency of Communication with Friends and Family: More than three times a week     Frequency of Social Gatherings with Friends and Family: Once a week     Attends [employer] Services: More than 4 times per year     Active Member of Clubs or Organizations: Yes     Attends Club or Organization Meetings: More than 4 times per year     Marital Status:    Intimate Partner Violence: Not At Risk (10/2/2023)    Received from [facility]     Humiliation, Afraid, Rape, and Kick questionnaire     Fear of Current or Ex-Partner: No     Emotionally Abused: No     Physically Abused: No     Sexually Abused: No   Housing Stability: Low Risk  (10/2/2023)    [facility] from [facility]    Housing Stability Vital Sign     Unable to Pay for Housing in the Last Year: No     Number of Places Lived in the Last Year: 1     Unstable Housing in the Last Year: No       Past Surgical History:   Procedure Laterality Date    OTHER SURGICAL HISTORY  12/04/2021    Colonoscopy    OTHER SURGICAL HISTORY  12/04/2021    Neck surgery    OTHER SURGICAL HISTORY Left 08/09/2022    Anterior cruciate ligament repair    OTHER SURGICAL HISTORY Left 08/09/2022    Meniscus repair            Review of Systems   GENERAL: Negative for malaise, significant weight loss, fever  MUSCULOSKELETAL: see HPI  NEURO:  Negative      Exam  side: left Knee:  Skin healthy and intact  No gross swelling or ecchymosis  Alignment: normal  Correctable: Not Applicable  Effusion: mild  ROM:  0-130 degrees  Crepitance with range of motion  No pain with internal rotation of the hip  Tenderness to palpation: medial, lateral Pain with patellar compression: Yes  No laxity to valgus stress  No laxity to varus stress  Negative Lachman´s test  Negative posterior drawer test  negative Madiha´s test     Neurovascular exam normal distally  2+ DP pulse and good cap refill     Imaging  Left knee films are negative for fracture, dislocation or destructive lesion.  Button is seen off the lateral supracondylar femur consistent with a prior ACL reconstruction.  The ghost tracks are seen.  There is moderate degenerative change with joint space narrowing, sclerosis and spurring.       Assessment  Patient with known osteoarthritis of the side: left knee     Plan  We reviewed an evidence-based approach to OA of the knee.  We discussed past treatments as well options for future treatment.  We had a lengthy discussion  regarding his options.  He would like to proceed with a knee arthroplasty he believes.  The patient has failed to improve with multiple non-operative modalities.  There is increasing difficulty with activities of daily living and concern for falls.  The patient is endorsing severe pain and disability.       We had a lengthy discussion regarding total knee arthroplasty including the orthopaedic risks, including but not limited to, stiffness and arthrofibrosis, infection, hematoma, early aseptic loosening, neurologic or vascular injury, clicking, difficulty kneeling and incomplete relief of pain.  We reviewed the medical risks, including but not limited to, deep venous thrombosis, pulmonary embolism, and cardiovascular/pulmonary issues.     We discussed the anticipated longevity of the implants and potential for revision surgery.  We also discussed anticoagulation, rehabilitation goals, and the hospital course.  We discussed the likelihood of opioid analgesics and risks associated with them.  The next step is to obtain medical risk stratification and encouraged the pre-operative information seminar at the hospital.  We are happy to provide assistance and counseling if the patient has any additional concerns.

## 2024-11-05 ENCOUNTER — OFFICE VISIT (OUTPATIENT)
Dept: ORTHOPEDIC SURGERY | Age: 49
End: 2024-11-05
Payer: MEDICAID

## 2024-11-05 VITALS — BODY MASS INDEX: 30.88 KG/M2 | RESPIRATION RATE: 14 BRPM | WEIGHT: 233 LBS | HEIGHT: 73 IN

## 2024-11-05 DIAGNOSIS — M17.12 PRIMARY OSTEOARTHRITIS OF LEFT KNEE: Primary | ICD-10-CM

## 2024-11-05 DIAGNOSIS — M23.242 DERANGEMENT OF ANTERIOR HORN OF LATERAL MENISCUS DUE TO OLD TEAR OR INJURY, LEFT KNEE: ICD-10-CM

## 2024-11-05 PROCEDURE — 99204 OFFICE O/P NEW MOD 45 MIN: CPT | Performed by: ORTHOPAEDIC SURGERY

## 2024-11-05 NOTE — PROGRESS NOTES
[facility]  [address] [address]  [address] [place], [place] [id number]  Dept: [phone]     Orthopedic Surgery    Knee Pain (Left knee)       11/05/24  Nikko Mueller is a 49 y.o. male presenting for evaluation of left knee pain.  He has a long history regarding the left knee.  This was originally a basketball injury back in February 2022.  He was living in [place] at the time.  He sustained an ACL tear of the left knee as well as a medial meniscus tear.  He initially had surgery by Dr. Johnston.  This was an allograft ACL reconstruction with partial medial meniscectomy in April 2022.  He developed postoperative stiffness from this and required a manipulation under anesthesia the following month in May 2022.  He believes that this procedure led to tearing of the anterior horn lateral meniscus as he had subsequent anterolateral knee pain after this manipulation.  This persisted despite physical therapy and injections.  Ultimately a repeat MRI was obtained which demonstrated a tear of the anterior horn of the lateral meniscus.  He therefore underwent a third surgery by Dr. Gould, at this time a left knee arthroscopy with debridement of the anterior horn lateral meniscus tear.  Despite the surgery he had persistent pain all throughout 2023.  He ultimately sought a second opinion from a nonsurgical sports medicine physician, Dr. Denise.  Dr. Denise ordered a new MRI which demonstrated again anterior horn lateral meniscus tear.  He went back to Dr. Gould for a fourth surgery, which was a another debridement.  This did not help him.  He tried a corticosteroid injection which did help.  A gel injection did not help.  He has now moved to [place] and is asking me to take over care for the left knee.  At 1 point he was even considering a total knee replacement as he had such significant pain.  He works in construction.  Most

## (undated) DEVICE — TUBING, PATIENT 8FT STERILE

## (undated) DEVICE — STRAP, VELCRO, BODY, 4 X 60IN, NS

## (undated) DEVICE — Device

## (undated) DEVICE — GLOVE, SURGICAL, PROTEXIS PI BLUE W/NEUTHERA, 7.5, PF, LF

## (undated) DEVICE — GLOVE, SURGICAL, PROTEXIS PI , 8.5, PF, LF

## (undated) DEVICE — APPLICATOR, CHLORAPREP, W/ORANGE TINT, 26ML

## (undated) DEVICE — COVER, MAYO STAND, W/PAD, 23 IN, DISPOSABLE, PLASTIC, LF, STERILE

## (undated) DEVICE — GOWN, SURGICAL, ROYAL SILK, XXL, STERILE

## (undated) DEVICE — STRAP, ARM BOARD, 32 X 1.5

## (undated) DEVICE — BANDAGE, ELASTIC, ACE, ACE, DOUBLE LENGTH, 6 X 550 IN, LF

## (undated) DEVICE — TUBING, SUCTION, 6MM X 10, CLEAN N-COND

## (undated) DEVICE — SUTURE, ETHILON, 3-0, 18 IN, PS1, BLACK

## (undated) DEVICE — PADDING, CAST, SPECIALIST, 6 IN X 4 YD, STERILE

## (undated) DEVICE — DRESSING, GAUZE, PETROLATUM, STRIP, XEROFORM, 1 X 8 IN, STERILE

## (undated) DEVICE — DRESSING, ABDOMINAL PAD, CURITY, 7.5 X 8 IN

## (undated) DEVICE — GLOVE, SURGICAL, PROTEXIS PI , 7.5, PF, LF

## (undated) DEVICE — POSITIONER, WELL LEG HOLDER

## (undated) DEVICE — PROBE, SERFAS, 3.5MM, 50 S, ENERGY SUCTION SYSTEM

## (undated) DEVICE — TUBING, PUMP REDEUCE 8FT STERILE

## (undated) DEVICE — MANIFOLD, 4 PORT NEPTUNE STANDARD